# Patient Record
Sex: MALE | NOT HISPANIC OR LATINO | Employment: FULL TIME
[De-identification: names, ages, dates, MRNs, and addresses within clinical notes are randomized per-mention and may not be internally consistent; named-entity substitution may affect disease eponyms.]

---

## 2018-04-25 ENCOUNTER — RECORDS - HEALTHEAST (OUTPATIENT)
Dept: ADMINISTRATIVE | Facility: OTHER | Age: 53
End: 2018-04-25

## 2018-04-25 ENCOUNTER — OFFICE VISIT - HEALTHEAST (OUTPATIENT)
Dept: FAMILY MEDICINE | Facility: CLINIC | Age: 53
End: 2018-04-25

## 2018-04-25 DIAGNOSIS — Z00.00 HEALTH CARE MAINTENANCE: ICD-10-CM

## 2018-04-25 DIAGNOSIS — Z12.11 SPECIAL SCREENING FOR MALIGNANT NEOPLASMS, COLON: ICD-10-CM

## 2018-04-25 DIAGNOSIS — Z00.00 ROUTINE ADULT HEALTH MAINTENANCE: ICD-10-CM

## 2018-04-25 DIAGNOSIS — M15.9 GENERALIZED OA: ICD-10-CM

## 2018-04-25 ASSESSMENT — MIFFLIN-ST. JEOR: SCORE: 2531.57

## 2018-05-02 ENCOUNTER — AMBULATORY - HEALTHEAST (OUTPATIENT)
Dept: LAB | Facility: CLINIC | Age: 53
End: 2018-05-02

## 2018-05-02 DIAGNOSIS — Z00.00 HEALTH CARE MAINTENANCE: ICD-10-CM

## 2018-05-02 DIAGNOSIS — Z00.00 ROUTINE ADULT HEALTH MAINTENANCE: ICD-10-CM

## 2018-05-02 LAB
ALBUMIN SERPL-MCNC: 3.8 G/DL (ref 3.5–5)
ALP SERPL-CCNC: 56 U/L (ref 45–120)
ALT SERPL W P-5'-P-CCNC: 40 U/L (ref 0–45)
ANION GAP SERPL CALCULATED.3IONS-SCNC: 7 MMOL/L (ref 5–18)
AST SERPL W P-5'-P-CCNC: 23 U/L (ref 0–40)
BILIRUB SERPL-MCNC: 0.6 MG/DL (ref 0–1)
BUN SERPL-MCNC: 16 MG/DL (ref 8–22)
CALCIUM SERPL-MCNC: 9.2 MG/DL (ref 8.5–10.5)
CHLORIDE BLD-SCNC: 105 MMOL/L (ref 98–107)
CHOLEST SERPL-MCNC: 144 MG/DL
CO2 SERPL-SCNC: 27 MMOL/L (ref 22–31)
CREAT SERPL-MCNC: 0.9 MG/DL (ref 0.7–1.3)
ERYTHROCYTE [DISTWIDTH] IN BLOOD BY AUTOMATED COUNT: 13.6 % (ref 11–14.5)
FASTING STATUS PATIENT QL REPORTED: YES
GFR SERPL CREATININE-BSD FRML MDRD: >60 ML/MIN/1.73M2
GLUCOSE BLD-MCNC: 103 MG/DL (ref 70–125)
HCT VFR BLD AUTO: 41.8 % (ref 40–54)
HDLC SERPL-MCNC: 31 MG/DL
HGB BLD-MCNC: 14.2 G/DL (ref 14–18)
LDLC SERPL CALC-MCNC: 67 MG/DL
MCH RBC QN AUTO: 29.4 PG (ref 27–34)
MCHC RBC AUTO-ENTMCNC: 34 G/DL (ref 32–36)
MCV RBC AUTO: 87 FL (ref 80–100)
PLATELET # BLD AUTO: 176 THOU/UL (ref 140–440)
PMV BLD AUTO: 7.3 FL (ref 7–10)
POTASSIUM BLD-SCNC: 4.7 MMOL/L (ref 3.5–5)
PROT SERPL-MCNC: 6.8 G/DL (ref 6–8)
PSA SERPL-MCNC: 0.7 NG/ML (ref 0–3.5)
RBC # BLD AUTO: 4.83 MILL/UL (ref 4.4–6.2)
SODIUM SERPL-SCNC: 139 MMOL/L (ref 136–145)
TRIGL SERPL-MCNC: 232 MG/DL
WBC: 3.5 THOU/UL (ref 4–11)

## 2018-06-05 ENCOUNTER — RECORDS - HEALTHEAST (OUTPATIENT)
Dept: ADMINISTRATIVE | Facility: OTHER | Age: 53
End: 2018-06-05

## 2019-07-30 ENCOUNTER — RECORDS - HEALTHEAST (OUTPATIENT)
Dept: ADMINISTRATIVE | Facility: OTHER | Age: 54
End: 2019-07-30

## 2019-09-18 ENCOUNTER — OFFICE VISIT - HEALTHEAST (OUTPATIENT)
Dept: FAMILY MEDICINE | Facility: CLINIC | Age: 54
End: 2019-09-18

## 2019-09-18 DIAGNOSIS — J32.9 SINUSITIS, UNSPECIFIED CHRONICITY, UNSPECIFIED LOCATION: ICD-10-CM

## 2019-09-18 DIAGNOSIS — K21.9 GASTROESOPHAGEAL REFLUX DISEASE, ESOPHAGITIS PRESENCE NOT SPECIFIED: ICD-10-CM

## 2019-09-25 ENCOUNTER — OFFICE VISIT - HEALTHEAST (OUTPATIENT)
Dept: FAMILY MEDICINE | Facility: CLINIC | Age: 54
End: 2019-09-25

## 2019-09-25 DIAGNOSIS — J32.9 SINUSITIS, UNSPECIFIED CHRONICITY, UNSPECIFIED LOCATION: ICD-10-CM

## 2019-09-25 DIAGNOSIS — E66.813 CLASS 3 SEVERE OBESITY WITHOUT SERIOUS COMORBIDITY WITH BODY MASS INDEX (BMI) OF 40.0 TO 44.9 IN ADULT, UNSPECIFIED OBESITY TYPE (H): ICD-10-CM

## 2019-09-25 DIAGNOSIS — Z00.00 HEALTH CARE MAINTENANCE: ICD-10-CM

## 2019-09-25 DIAGNOSIS — K21.9 GASTROESOPHAGEAL REFLUX DISEASE, ESOPHAGITIS PRESENCE NOT SPECIFIED: ICD-10-CM

## 2019-09-25 DIAGNOSIS — E66.01 CLASS 3 SEVERE OBESITY WITHOUT SERIOUS COMORBIDITY WITH BODY MASS INDEX (BMI) OF 40.0 TO 44.9 IN ADULT, UNSPECIFIED OBESITY TYPE (H): ICD-10-CM

## 2019-09-25 ASSESSMENT — MIFFLIN-ST. JEOR: SCORE: 2455.93

## 2019-10-03 ENCOUNTER — COMMUNICATION - HEALTHEAST (OUTPATIENT)
Dept: FAMILY MEDICINE | Facility: CLINIC | Age: 54
End: 2019-10-03

## 2019-10-04 ENCOUNTER — AMBULATORY - HEALTHEAST (OUTPATIENT)
Dept: LAB | Facility: CLINIC | Age: 54
End: 2019-10-04

## 2019-10-04 DIAGNOSIS — Z00.00 HEALTH CARE MAINTENANCE: ICD-10-CM

## 2019-10-04 LAB
ALBUMIN SERPL-MCNC: 3.9 G/DL (ref 3.5–5)
ALP SERPL-CCNC: 52 U/L (ref 45–120)
ALT SERPL W P-5'-P-CCNC: 23 U/L (ref 0–45)
ANION GAP SERPL CALCULATED.3IONS-SCNC: 8 MMOL/L (ref 5–18)
AST SERPL W P-5'-P-CCNC: 16 U/L (ref 0–40)
BILIRUB SERPL-MCNC: 0.5 MG/DL (ref 0–1)
BUN SERPL-MCNC: 16 MG/DL (ref 8–22)
CALCIUM SERPL-MCNC: 9.4 MG/DL (ref 8.5–10.5)
CHLORIDE BLD-SCNC: 104 MMOL/L (ref 98–107)
CHOLEST SERPL-MCNC: 134 MG/DL
CO2 SERPL-SCNC: 28 MMOL/L (ref 22–31)
CREAT SERPL-MCNC: 0.93 MG/DL (ref 0.7–1.3)
ERYTHROCYTE [DISTWIDTH] IN BLOOD BY AUTOMATED COUNT: 12.4 % (ref 11–14.5)
FASTING STATUS PATIENT QL REPORTED: YES
GFR SERPL CREATININE-BSD FRML MDRD: >60 ML/MIN/1.73M2
GLUCOSE BLD-MCNC: 104 MG/DL (ref 70–125)
HCT VFR BLD AUTO: 41.3 % (ref 40–54)
HDLC SERPL-MCNC: 34 MG/DL
HGB BLD-MCNC: 13.6 G/DL (ref 14–18)
LDLC SERPL CALC-MCNC: 77 MG/DL
MCH RBC QN AUTO: 28.6 PG (ref 27–34)
MCHC RBC AUTO-ENTMCNC: 33 G/DL (ref 32–36)
MCV RBC AUTO: 87 FL (ref 80–100)
PLATELET # BLD AUTO: 193 THOU/UL (ref 140–440)
PMV BLD AUTO: 7.9 FL (ref 7–10)
POTASSIUM BLD-SCNC: 4.7 MMOL/L (ref 3.5–5)
PROT SERPL-MCNC: 6.3 G/DL (ref 6–8)
PSA SERPL-MCNC: 0.5 NG/ML (ref 0–3.5)
RBC # BLD AUTO: 4.75 MILL/UL (ref 4.4–6.2)
SODIUM SERPL-SCNC: 140 MMOL/L (ref 136–145)
TRIGL SERPL-MCNC: 114 MG/DL
WBC: 5.4 THOU/UL (ref 4–11)

## 2019-10-09 ENCOUNTER — RECORDS - HEALTHEAST (OUTPATIENT)
Dept: ADMINISTRATIVE | Facility: OTHER | Age: 54
End: 2019-10-09

## 2019-11-05 ENCOUNTER — RECORDS - HEALTHEAST (OUTPATIENT)
Dept: ADMINISTRATIVE | Facility: OTHER | Age: 54
End: 2019-11-05

## 2019-11-08 ENCOUNTER — COMMUNICATION - HEALTHEAST (OUTPATIENT)
Dept: FAMILY MEDICINE | Facility: CLINIC | Age: 54
End: 2019-11-08

## 2019-11-12 ENCOUNTER — AMBULATORY - HEALTHEAST (OUTPATIENT)
Dept: FAMILY MEDICINE | Facility: CLINIC | Age: 54
End: 2019-11-12

## 2019-11-12 DIAGNOSIS — K21.9 GASTROESOPHAGEAL REFLUX DISEASE, ESOPHAGITIS PRESENCE NOT SPECIFIED: ICD-10-CM

## 2019-12-20 ENCOUNTER — OFFICE VISIT - HEALTHEAST (OUTPATIENT)
Dept: FAMILY MEDICINE | Facility: CLINIC | Age: 54
End: 2019-12-20

## 2019-12-20 DIAGNOSIS — J06.9 UPPER RESPIRATORY TRACT INFECTION, UNSPECIFIED TYPE: ICD-10-CM

## 2019-12-20 DIAGNOSIS — K21.9 GASTROESOPHAGEAL REFLUX DISEASE, ESOPHAGITIS PRESENCE NOT SPECIFIED: ICD-10-CM

## 2020-02-17 ENCOUNTER — COMMUNICATION - HEALTHEAST (OUTPATIENT)
Dept: FAMILY MEDICINE | Facility: CLINIC | Age: 55
End: 2020-02-17

## 2020-02-17 DIAGNOSIS — K21.9 GASTROESOPHAGEAL REFLUX DISEASE, ESOPHAGITIS PRESENCE NOT SPECIFIED: ICD-10-CM

## 2020-03-17 ENCOUNTER — COMMUNICATION - HEALTHEAST (OUTPATIENT)
Dept: FAMILY MEDICINE | Facility: CLINIC | Age: 55
End: 2020-03-17

## 2020-03-17 DIAGNOSIS — K21.9 GASTROESOPHAGEAL REFLUX DISEASE, ESOPHAGITIS PRESENCE NOT SPECIFIED: ICD-10-CM

## 2020-09-14 ENCOUNTER — COMMUNICATION - HEALTHEAST (OUTPATIENT)
Dept: SCHEDULING | Facility: CLINIC | Age: 55
End: 2020-09-14

## 2020-11-25 ENCOUNTER — OFFICE VISIT - HEALTHEAST (OUTPATIENT)
Dept: FAMILY MEDICINE | Facility: CLINIC | Age: 55
End: 2020-11-25

## 2020-11-25 DIAGNOSIS — Z86.19 HISTORY OF POSITIVE HEPATITIS C: ICD-10-CM

## 2020-11-25 DIAGNOSIS — E66.01 MORBID OBESITY (H): ICD-10-CM

## 2020-11-25 DIAGNOSIS — Z01.818 PREOP GENERAL PHYSICAL EXAM: ICD-10-CM

## 2020-11-25 DIAGNOSIS — K21.9 GASTROESOPHAGEAL REFLUX DISEASE: ICD-10-CM

## 2020-11-25 DIAGNOSIS — R06.83 SNORING: ICD-10-CM

## 2020-11-25 LAB
ANION GAP SERPL CALCULATED.3IONS-SCNC: 12 MMOL/L (ref 5–18)
BUN SERPL-MCNC: 14 MG/DL (ref 8–22)
CALCIUM SERPL-MCNC: 9.1 MG/DL (ref 8.5–10.5)
CHLORIDE BLD-SCNC: 107 MMOL/L (ref 98–107)
CO2 SERPL-SCNC: 23 MMOL/L (ref 22–31)
CREAT SERPL-MCNC: 0.82 MG/DL (ref 0.7–1.3)
ERYTHROCYTE [DISTWIDTH] IN BLOOD BY AUTOMATED COUNT: 12.9 % (ref 11–14.5)
GFR SERPL CREATININE-BSD FRML MDRD: >60 ML/MIN/1.73M2
GLUCOSE BLD-MCNC: 101 MG/DL (ref 70–125)
HCT VFR BLD AUTO: 42.8 % (ref 40–54)
HCV AB SERPL QL IA: POSITIVE
HGB BLD-MCNC: 14.3 G/DL (ref 14–18)
MCH RBC QN AUTO: 29.4 PG (ref 27–34)
MCHC RBC AUTO-ENTMCNC: 33.4 G/DL (ref 32–36)
MCV RBC AUTO: 88 FL (ref 80–100)
PLATELET # BLD AUTO: 177 THOU/UL (ref 140–440)
PMV BLD AUTO: 7.7 FL (ref 7–10)
POTASSIUM BLD-SCNC: 4.8 MMOL/L (ref 3.5–5)
RBC # BLD AUTO: 4.85 MILL/UL (ref 4.4–6.2)
SODIUM SERPL-SCNC: 142 MMOL/L (ref 136–145)
WBC: 3.6 THOU/UL (ref 4–11)

## 2020-11-25 RX ORDER — FAMOTIDINE 20 MG/1
20 TABLET, FILM COATED ORAL 2 TIMES DAILY PRN
Qty: 60 TABLET | Refills: 2 | Status: SHIPPED | OUTPATIENT
Start: 2020-11-25 | End: 2022-02-23

## 2020-11-25 ASSESSMENT — MIFFLIN-ST. JEOR: SCORE: 2520.22

## 2020-11-27 ENCOUNTER — AMBULATORY - HEALTHEAST (OUTPATIENT)
Dept: FAMILY MEDICINE | Facility: CLINIC | Age: 55
End: 2020-11-27

## 2020-11-27 DIAGNOSIS — Z86.19 HISTORY OF POSITIVE HEPATITIS C: ICD-10-CM

## 2020-12-01 ENCOUNTER — AMBULATORY - HEALTHEAST (OUTPATIENT)
Dept: LAB | Facility: CLINIC | Age: 55
End: 2020-12-01

## 2020-12-01 DIAGNOSIS — Z86.19 HISTORY OF POSITIVE HEPATITIS C: ICD-10-CM

## 2020-12-04 ENCOUNTER — COMMUNICATION - HEALTHEAST (OUTPATIENT)
Dept: FAMILY MEDICINE | Facility: CLINIC | Age: 55
End: 2020-12-04

## 2020-12-04 LAB
HCV RNA SERPL NAA+PROBE-ACNC: NORMAL [IU]/ML
HCV RNA SERPL NAA+PROBE-LOG IU: NORMAL LOG IU/ML

## 2020-12-15 ENCOUNTER — COMMUNICATION - HEALTHEAST (OUTPATIENT)
Dept: SLEEP MEDICINE | Facility: CLINIC | Age: 55
End: 2020-12-15

## 2020-12-18 ENCOUNTER — RECORDS - HEALTHEAST (OUTPATIENT)
Dept: ADMINISTRATIVE | Facility: OTHER | Age: 55
End: 2020-12-18

## 2021-01-06 ENCOUNTER — RECORDS - HEALTHEAST (OUTPATIENT)
Dept: ADMINISTRATIVE | Facility: OTHER | Age: 56
End: 2021-01-06

## 2021-02-03 ENCOUNTER — RECORDS - HEALTHEAST (OUTPATIENT)
Dept: ADMINISTRATIVE | Facility: OTHER | Age: 56
End: 2021-02-03

## 2021-05-13 ENCOUNTER — RECORDS - HEALTHEAST (OUTPATIENT)
Dept: ADMINISTRATIVE | Facility: OTHER | Age: 56
End: 2021-05-13

## 2021-05-24 ENCOUNTER — OFFICE VISIT - HEALTHEAST (OUTPATIENT)
Dept: FAMILY MEDICINE | Facility: CLINIC | Age: 56
End: 2021-05-24

## 2021-05-24 DIAGNOSIS — R05.9 COUGH: ICD-10-CM

## 2021-05-24 DIAGNOSIS — J06.9 UPPER RESPIRATORY TRACT INFECTION, UNSPECIFIED TYPE: ICD-10-CM

## 2021-06-01 VITALS — WEIGHT: 315 LBS | HEIGHT: 77 IN | BODY MASS INDEX: 37.19 KG/M2

## 2021-06-01 NOTE — PROGRESS NOTES
Assessment:      Healthy male exam.    [facility]  GERD  Sinusitis  Obesity  Plan:       All questions answered.    HCM-patient is up-to-date on immunizations, will check with insurance on coverage for the shingles vaccine, will return at a future date for fasting blood work, up-to-date on colonoscopy  GERD-patient takes omeprazole daily but continues to have some symptoms after eating-recently a little worse with a sinus infection-discussed with him after completion of antibiotics if symptoms continue would recommend EGD for further evaluation  Sinusitis-patient symptoms have been improving since last week since starting amoxicillin he has a few days left on the antibiotics-with lingering symptoms still present although improving I am going to extend the course of treatment from 10 days out of 14  Obesity-patient is aware of his weight and is actively working on lifestyle changes and eating habits has lost about 20 pounds since last year-encouraged him to continue with improvement and hopefully we see some benefit when looking at cholesterol levels as well  Subjective:      Miky Mccann is a 54 y.o. male who presents for an annual exam. The patient reports that there is not domestic violence in his life.  Patient is here for annual exam.  He will need to return for fasting blood work in the future when fasting.  He is up-to-date on colonoscopy screening.  Discussed the new shingles vaccine  Patient is aware of his weight and the need for weight loss-he is lost about 20 pounds since last year and I encouraged him to continue work on lifestyle changes  He was seen last week for increasing congestion and sinus trouble and started on antibody 6-a 10-day course was given and his symptoms have improved greatly but have not resolved discussed with him I think he is in need 14 days of treatment and additional antibiotics will be sent to the pharmacy.  During this time his acid reflux seems to have given him more problems as  well he has been taking 40 mg daily of omeprazole and we discussed if symptoms are not resolved he still continues to have acid reflux symptoms after completion of antibiotics for the sinus infection that we should consider doing an EGD-he expresses understanding with this plan.    Healthy Habits:   Regular Exercise: Yes  Sunscreen Use: Yes  Healthy Diet: Yes  Dental Visits Regularly: Yes  Seat Belt: Yes  Sexually active: Yes  Colonoscopy: Yes  Lipid Profile: Yes  Glucose Screen: Yes      Immunization History   Administered Date(s) Administered     Influenza, seasonal,quad inj 6-35 mos 10/01/2010, 09/17/2013     Td,adult,historic,unspecified 02/25/2009     Tdap 02/25/2009     Immunization status: up to date and documented.    No exam data present    Current Outpatient Medications   Medication Sig Dispense Refill     amoxicillin (AMOXIL) 500 MG tablet Take 1 tablet (500 mg total) by mouth 3 (three) times a day for 4 days. 12 tablet 0     fluticasone propionate (FLONASE) 50 mcg/actuation nasal spray SPRAY 2 SPRAYS INTO EACH NOSTRIL EVERY DAY AS DIRECTED  1     ibuprofen (ADVIL,MOTRIN) 200 MG tablet Take 800 mg by mouth daily as needed for pain.       omeprazole (PRILOSEC) 20 MG capsule TAKE 2 CAPSULES BY MOUTH EVERY DAY AS DIRECTED. MAX 30 DAY SUPPLY  0     No current facility-administered medications for this visit.      No past medical history on file.  No past surgical history on file.  Patient has no known allergies.  No family history on file.  Social History     Socioeconomic History     Marital status:      Spouse name: Not on file     Number of children: Not on file     Years of education: Not on file     Highest education level: Not on file   Occupational History     Not on file   Social Needs     Financial resource strain: Not on file     Food insecurity:     Worry: Not on file     Inability: Not on file     Transportation needs:     Medical: Not on file     Non-medical: Not on file   Tobacco Use      "Smoking status: Former Smoker     Smokeless tobacco: Former User     Types: Chew     Quit date: 7/18/2019   Substance and Sexual Activity     Alcohol use: Yes     Alcohol/week: 8.3 - 10.0 standard drinks     Types: 10 - 12 Standard drinks or equivalent per week     Drug use: No     Sexual activity: Not on file   Lifestyle     Physical activity:     Days per week: Not on file     Minutes per session: Not on file     Stress: Not on file   Relationships     Social connections:     Talks on phone: Not on file     Gets together: Not on file     Attends Hindu service: Not on file     Active member of club or organization: Not on file     Attends meetings of clubs or organizations: Not on file     Relationship status: Not on file     Intimate partner violence:     Fear of current or ex partner: Not on file     Emotionally abused: Not on file     Physically abused: Not on file     Forced sexual activity: Not on file   Other Topics Concern     Not on file   Social History Narrative     Not on file       Review of Systems  General:  Denies problem  Eyes: Denies problem  Ears/Nose/Throat: Denies problem  Cardiovascular: Denies problem  Respiratory:  Denies problem  Gastrointestinal:  Denies problem  Genitourinary: Denies problem  Musculoskeletal:  Denies problem  Skin: Denies problem  Neurologic: Denies problem  Psychiatric: Denies problem  Endocrine: Denies problem  Heme/Lymphatic: Denies problem   Allergic/Immunologic: Denies problem        Objective:     Vitals:    09/25/19 1548   BP: 110/73   Pulse: 63   Resp: 20   Temp: (!) 96.5  F (35.8  C)   TempSrc: Oral   SpO2: 97%   Weight: (!) 335 lb 3.2 oz (152 kg)   Height: 6' 4.25\" (1.937 m)     Body mass index is 40.53 kg/m .    Physical  General Appearance: Alert, cooperative, no distress, appears stated age  Head: Normocephalic, without obvious abnormality, atraumatic  Eyes: PERRL, conjunctiva/corneas clear, EOM's intact  Ears: fluid behind TM's BL, no erythema  Nose: " Nares normal, septum midline,mucosa normal, no drainage  Throat: Lips, mucosa, and tongue normal; teeth and gums normal  Neck: Supple, symmetrical, trachea midline, no adenopathy;  thyroid: not enlarged, symmetric, no tenderness/mass/nodules; no carotid bruit or JVD  Back: Symmetric, no curvature, ROM normal, no CVA tenderness  Lungs: Clear to auscultation bilaterally, respirations unlabored  Heart: Regular rate and rhythm, S1 and S2 normal, no murmur, rub, or gallop,  Abdomen: Soft, non-tender, obese BMI 40  Musculoskeletal: Normal range of motion. No joint swelling or deformity.   Extremities: Extremities normal, atraumatic, no cyanosis or edema  Skin: Skin color, texture, turgor normal, no rashes or lesions  Lymph nodes: Cervical, supraclavicular, and axillary nodes normal  Neurologic: He is alert. He has normal reflexes.   Psychiatric: He has a normal mood and affect.

## 2021-06-01 NOTE — PROGRESS NOTES
ASSESSMENT/PLAN  1. Sinusitis, unspecified chronicity, unspecified location  Patient continues to have upper respiratory symptoms and congestion with drainage  Symptoms been present for weeks  Discussed on the course of amoxicillin 3 times daily for 10 days  Patient has a scheduled follow-up physical with me next week and we can check back in with improvement of symptoms  - amoxicillin (AMOXIL) 500 MG tablet; Take 1 tablet (500 mg total) by mouth 3 (three) times a day for 10 days.  Dispense: 30 tablet; Refill: 0    2. Gastroesophageal reflux disease, esophagitis presence not specified  Patient was seen at the ER for throat discomfort and some chest abnormality was followed up with ENT and diagnosed with acid reflux started on omeprazole  Patient has noted some improvement in his made some dietary lifestyle changes  Continues to have some congestion and not feeling back to baseline with upper respiratory symptoms  Discussed with him starting antibiotics for sinusitis  Continue with omeprazole  Continue with lifestyle changes with dietary changes and continued weight loss        SUBJECTIVE:   Chief Complaint   Patient presents with     Sore Throat     Pain in the throat x 2 months,  Sensitive when swallowing,      Miky Mccann 54 y.o. male    Current Outpatient Medications   Medication Sig Dispense Refill     fluticasone propionate (FLONASE) 50 mcg/actuation nasal spray SPRAY 2 SPRAYS INTO EACH NOSTRIL EVERY DAY AS DIRECTED  1     ibuprofen (ADVIL,MOTRIN) 200 MG tablet Take 800 mg by mouth daily as needed for pain.       omeprazole (PRILOSEC) 20 MG capsule TAKE 2 CAPSULES BY MOUTH EVERY DAY AS DIRECTED. MAX 30 DAY SUPPLY  0     amoxicillin (AMOXIL) 500 MG tablet Take 1 tablet (500 mg total) by mouth 3 (three) times a day for 10 days. 30 tablet 0     No current facility-administered medications for this visit.      Allergies: Patient has no known allergies.   No LMP for male patient.    HPI:   Patient is here for  ER follow-up and ENT follow-up.  Patient was seen over a month ago at the ER and imaging and lab work reviewed with the patient today.  He followed up with a ENT and after visualization of vocal cords and discussion he was started on omeprazole 20 mg twice daily.  His symptoms of acid reflux have improved however continues to have her upper airway congestion and drainage-discussed with him the diagnosis of sinusitis in addition to suspected acid reflux and possible gastritis.  I recommend starting amoxicillin for 10 days.  He has a follow-up physical with me next week and we can reassess symptoms after being on antibiotics.  He has made some lifestyle modifications which is helped with weight loss and decrease in acid reflux symptoms.    He is meeting with orthopedics and contemplating knee replacement in the near future.    ROS: negative except as per HPI    OBJECTIVE:   The patient appears well, alert, oriented x 3, in no distress.  Hoarse voice  /78 (Patient Site: Left Arm, Patient Position: Sitting, Cuff Size: Adult Large)   Pulse 71   Resp 18   Wt (!) 337 lb 6.4 oz (153 kg)   BMI 38.99 kg/m      HEENT: Patient has some posterior oropharynx drainage, positive lymphadenopathy anterior cervical chains bilaterally, fluid noted behind the TMs bilaterally right greater than left no erythema  Lungs: clear, good air entry, no wheezes, rhonchi or rales.   Cardiac: S1 and S2 normal, no murmurs, regular rate and rhythm.   Abdomen: normal bowel sounds, soft   Extremities: show no edema, normal peripheral pulses.   Neurological: normal, no focal findings.  Skin: clear, dry, no rashes/lesions  Psych- normal mood and affect      Pt states an understanding and agrees to the above plan.

## 2021-06-03 VITALS
BODY MASS INDEX: 38.36 KG/M2 | HEART RATE: 63 BPM | DIASTOLIC BLOOD PRESSURE: 73 MMHG | OXYGEN SATURATION: 97 % | SYSTOLIC BLOOD PRESSURE: 110 MMHG | WEIGHT: 315 LBS | HEIGHT: 76 IN | TEMPERATURE: 96.5 F | RESPIRATION RATE: 20 BRPM

## 2021-06-03 VITALS
BODY MASS INDEX: 38.99 KG/M2 | DIASTOLIC BLOOD PRESSURE: 78 MMHG | RESPIRATION RATE: 18 BRPM | SYSTOLIC BLOOD PRESSURE: 121 MMHG | HEART RATE: 71 BPM | WEIGHT: 315 LBS

## 2021-06-03 NOTE — TELEPHONE ENCOUNTER
----- Message from Alexandro Tan MD sent at 11/8/2019  1:26 PM CST -----  EGD results show acid reflux but no other concerns- their recommendation was to add pepcid to the current medication regimen.  This is an over the counter medication- if you would like me to send this to the pharmacy as a script please let me know.  Dr. Tan

## 2021-06-03 NOTE — TELEPHONE ENCOUNTER
Answering the question from the patient- is this life long- we should add it for a few months- and then go without it to see if it is needed.  Dr. Tan

## 2021-06-03 NOTE — TELEPHONE ENCOUNTER
Patient Returning Call  Reason for call:  Message from clinic  Information relayed to patient:  Message from Alexandro Tan MD sent at 11/8/2019  1:26 PM CST -----  EGD results show acid reflux but no other concerns- their recommendation was to add pepcid to the current medication regimen.  This is an over the counter medication- if you would like me to send this to the pharmacy as a script please let me know.  Dr. Tan  Patient has additional questions:  Yes  If YES, what are your questions/concerns:  1)  Please send a prescription to [facility][id number].  2) Will this medication to life long?    Okay to leave a detailed message?: Yes

## 2021-06-03 NOTE — TELEPHONE ENCOUNTER
Left message to call back for: results  Information to relay to patient:  LMTCB, Please relay message below from .

## 2021-06-04 VITALS
HEART RATE: 77 BPM | WEIGHT: 315 LBS | BODY MASS INDEX: 40.87 KG/M2 | DIASTOLIC BLOOD PRESSURE: 76 MMHG | RESPIRATION RATE: 20 BRPM | TEMPERATURE: 97.1 F | SYSTOLIC BLOOD PRESSURE: 108 MMHG

## 2021-06-04 NOTE — PROGRESS NOTES
ASSESSMENT/PLAN  1. Gastroesophageal reflux disease, esophagitis presence not specified  Patient continues to have some symptoms of acid reflux  We will change omeprazole to Protonix and continue with Pepcid  Patient will contact me if symptoms are not further improving  - pantoprazole (PROTONIX) 20 MG tablet; Take 1 tablet (20 mg total) by mouth 2 (two) times a day.  Dispense: 60 tablet; Refill: 1    2. Upper respiratory tract infection, unspecified type  Patient had increasing sinus symptoms last week but these have been slowly improving  Continue to monitor  Discussed hydration and using over-the-counter medications to improve symptoms        SUBJECTIVE:   Chief Complaint   Patient presents with     URI     Sinus pressure- worse on the left side      Follow Up     Discuss endoscopy results, heartburn      Miky Mccann 54 y.o. male    Current Outpatient Medications   Medication Sig Dispense Refill     famotidine (PEPCID) 20 MG tablet Take 1 tablet (20 mg total) by mouth 2 (two) times a day. 60 tablet 3     ibuprofen (ADVIL,MOTRIN) 200 MG tablet Take 800 mg by mouth daily as needed for pain.       pantoprazole (PROTONIX) 20 MG tablet Take 1 tablet (20 mg total) by mouth 2 (two) times a day. 60 tablet 1     No current facility-administered medications for this visit.      Allergies: Patient has no known allergies.   No LMP for male patient.    HPI:   Patient presented for follow-up after EGD  Patient had an EGD to further evaluate ongoing acid reflux symptoms  Is currently taking omeprazole and Pepcid twice daily  With ongoing symptoms on a regular basis discussed with him transitioning from omeprazole to Protonix  Patient is in agreement will contact me if symptoms are not improving    Reviewed EGD results with him today    Last week patient had increasing sinus pressure and congestion  Symptoms have slowly been improving over the last few days and we discussed continued monitoring    Update with a flu  vaccine today    ROS: negative except as per HPI    OBJECTIVE:   The patient appears well, alert, oriented x 3, in no distress.  /76 (Patient Site: Left Arm, Patient Position: Sitting, Cuff Size: Adult Large)   Pulse 77   Temp 97.1  F (36.2  C) (Oral)   Resp 20   Wt (!) 338 lb (153.3 kg)   BMI 40.87 kg/m      Lungs: clear, good air entry, no wheezes, rhonchi or rales.   Cardiac: S1 and S2 normal, no murmurs, regular rate and rhythm.   Abdomen: normal bowel sounds, soft   Extremities: show no edema, normal peripheral pulses.   Neurological: normal, no focal findings.  Skin: clear, dry, no rashes/lesions  Psych- normal mood and affect      Pt states an understanding and agrees to the above plan.

## 2021-06-05 VITALS
RESPIRATION RATE: 16 BRPM | SYSTOLIC BLOOD PRESSURE: 120 MMHG | HEIGHT: 76 IN | DIASTOLIC BLOOD PRESSURE: 81 MMHG | TEMPERATURE: 97.3 F | WEIGHT: 315 LBS | HEART RATE: 86 BPM | BODY MASS INDEX: 38.36 KG/M2

## 2021-06-06 NOTE — TELEPHONE ENCOUNTER
Received fax from pharmacy requesting refill on famotidine  Last OV: 12/20/19  Last refill: 11/12/19

## 2021-06-06 NOTE — TELEPHONE ENCOUNTER
Refill Approved    Rx renewed per Medication Renewal Policy. Medication was last renewed on 12/20/19.    Michelle Dwyer, [facility] Connection Triage/Med Refill 2/20/2020     Requested Prescriptions   Pending Prescriptions Disp Refills     pantoprazole (PROTONIX) 20 MG tablet 60 tablet 1     Sig: Take 1 tablet (20 mg total) by mouth 2 (two) times a day.       GI Medications Refill Protocol Passed - 2/17/2020  1:36 PM        Passed - PCP or prescribing provider visit in last 12 or next 3 months.     Last office visit with prescriber/PCP: 12/20/2019 Alexandro Tan MD OR same dept: 12/20/2019 Alexandro Tan MD OR same specialty: 12/20/2019 Alexandro Tan MD  Last physical: 9/25/2019 Last MTM visit: Visit date not found   Next visit within 3 mo: Visit date not found  Next physical within 3 mo: Visit date not found  Prescriber OR PCP: Alexandro Tan MD  Last diagnosis associated with med order: 1. Gastroesophageal reflux disease, esophagitis presence not specified  - pantoprazole (PROTONIX) 20 MG tablet; Take 1 tablet (20 mg total) by mouth 2 (two) times a day.  Dispense: 60 tablet; Refill: 1    If protocol passes may refill for 12 months if within 3 months of last provider visit (or a total of 15 months).

## 2021-06-13 NOTE — PROGRESS NOTES
I called the patient but no answer. Left message explaining recent clinic results and next steps. Advised to call clinic or send [employer] message with questions. Recommended he call and make a lab visit to have Hep C RNA quant drawn to determine if active infection or if cleared.     Dr. Ernst Wayne

## 2021-06-13 NOTE — PATIENT INSTRUCTIONS - HE

## 2021-06-13 NOTE — PROGRESS NOTES
"[facility]  [address] [facility] [address]  Dept: [phone]  Dept Fax: [phone]  Primary Provider: Alexandro Tan MD  Pre-op Performing Provider: CHANO VITALE    PREOPERATIVE EVALUATION:  Today's date: 11/25/2020    Miky Mccann is a 55 y.o. male who presents for a preoperative evaluation.    Surgical Information:  Surgery/Procedure: Right total knee replacement   Surgery Location: [facility]   Surgeon: Dr. Orr   Surgery Date: 12/9/2020  Time of Surgery: 8am  Where patient plans to recover: At home with family  Fax number for surgical facility: [phone]    Type of Anesthesia Anticipated: General    Subjective     HPI related to upcoming procedure: Over the last 5-6 years been having pain in the right knee.  Has been receiving regular cortisone shots through [place] for this for many years. Has been following with Dr. Orr for this. Reviewed MRI from 5 years ago and copied below of right knee. Recent X-ray of right knee showed \"bone on Bone\" per patient.  Cannot see this x-ray    MRI LEFT KNEE  6/30/2014 6:33 PM     INDICATION: Pain  TECHNIQUE: Routine.  COMPARISON: None Available     FINDINGS:  MEDIAL COMPARTMENT: Large radial tear of the posterior horn of the medial meniscus extends nearly the entire distance through the meniscus and is likely unstable. There is also a horizontal cleavage tear which extends into the medial meniscal body.   Thinning of the articular cartilage on both sides of the compartment, more marked along the medial femoral condyle. Reactive edema within the bones on both sides of the medial compartment.     LATERAL COMPARTMENT: The lateral meniscus is intact. Smooth articular cartilage.     PATELLOFEMORAL COMPARTMENT: The retinaculum is intact. There is grade 3 chondromalacia along the lateral retropatellar facet with reactive subchondral edema and cystic change.     LIGAMENTS " AND TENDONS: The cruciate ligaments are intact. Grade 1 to grade 2 sprain of the MCL without evidence of tearing. Low-grade sprain of the fibular collateral joint. Mild popliteus tendinopathy. The posterolateral corner structures are intact.   The quadriceps and patella tendons are negative.     BONES AND SOFT TISSUES: No evidence for fracture. Small knee joint effusion. Soft tissues of the popliteal fossa are negative.     IMPRESSION:   CONCLUSION:  1.  Large radial tear of the posterior horn of the medial meniscus extends nearly the entire distance through the meniscus and is likely unstable.  2.  There is a horizontal cleavage tear extending from this radial tear into the medial meniscal body.  3.  Chondromalacia medial joint compartment, more marked on the femoral side. Extensive reactive subchondral edema on both sides of the compartment.  4.  Grade 1 to grade 2 sprain of the MCL without evidence of tearing.  5.  Low-grade sprain of the fibular collateral ligament without tearing.  6.  Popliteus tendinopathy.  7.  Grade 2 to grade 3 chondromalacia patella lateral retropatellar facet.  8.  Knee joint effusion.  9.  No evidence for fracture.    Preop Questions 11/22/2020   Have you ever had a heart attack or stroke? No   Have you ever had surgery on your heart or blood vessels, such as a stent placement, a coronary artery bypass, or surgery on an artery in your head, neck, heart, or legs? No   Do you have chest pain with activity? No   Do you have a history of  heart failure? No   Do you currently have a cold, bronchitis or symptoms of other infection? No   Do you have a cough, shortness of breath, or wheezing? UNKNOWN - Since he was diagnoed with acid reflux last summer he wheezes some.     Do you or anyone in your family have previous history of blood clots? No   Do you or does anyone in your family have a serious bleeding problem such as prolonged bleeding following surgeries or cuts? No   Have you ever had  problems with anemia or been told to take iron pills? No   Have you had any abnormal blood loss such as black, tarry or bloody stools? No   Have you ever had a blood transfusion? No   Are you willing to have a blood transfusion if it is medically needed before, during, or after your surgery? Yes   Have you or any of your relatives ever had problems with anesthesia? No   Do you have sleep apnea, excessive snoring or daytime drowsiness? UNKNOWN - He snores a lot.    Do you have any artifical heart valves or other implanted medical devices like a pacemaker, defibrillator, or continuous glucose monitor? No   Do you have artificial joints? No   Are you allergic to latex? No     Possible Sleep Apnea:    STOP-Bang Total Score 11/25/2020   Total Score 6   Risk Stratification 5 - 8: High Risk for ANNE-MARIE       Health Care Directive:  Patient does not have a Health Care Directive or Living Will: Discussed advance care planning with patient; however, patient declined at this time.    Preoperative Review of :    reviewed - no record of controlled substances prescribed.    SLEEP PROBLEM - Patient has a longstanding history of snoring, excessive daytime somnolence and fatigue.. Patient has tried OTC medications with limited success.       Review of Systems  ROS is negative except as noted in the HPI    Patient Active Problem List    Diagnosis Date Noted     Morbid obesity (H) 11/25/2020     Hepatitis C 11/25/2020     Gastroesophageal reflux disease 11/25/2020     Acute Meniscal Tear      No past medical history on file.  No past surgical history on file.  Current Outpatient Medications   Medication Sig Dispense Refill     famotidine (PEPCID) 20 MG tablet Take 1 tablet (20 mg total) by mouth 2 (two) times a day as needed for heartburn. 60 tablet 2     No current facility-administered medications for this visit.        No Known Allergies    Social History     Tobacco Use     Smoking status: Former Smoker     Packs/day: 1.00      "Years: 10.00     Pack years: 10.00     Smokeless tobacco: Former User     Types: Chew     Quit date: 7/18/2019   Substance Use Topics     Alcohol use: Yes     Alcohol/week: 8.3 - 10.0 standard drinks     Types: 10 - 12 Standard drinks or equivalent per week      No family history on file.  Social History     Substance and Sexual Activity   Drug Use No        Objective     Physical Exam  /81 (Patient Site: Left Arm, Patient Position: Sitting, Cuff Size: Adult Large)   Pulse 86   Temp 97.3  F (36.3  C) (Oral)   Resp 16   Ht 6' 4.25\" (1.937 m)   Wt (!) 349 lb 6 oz (158.5 kg)   BMI 42.25 kg/m      General appearance: Alert, cooperative, no distress, appears stated age, obese  Head: Normocephalic, atraumatic, without obvious abnormality  Eyes: Pupils equal round, reactive.  Conjunctiva clear.  Ears: TM's grey dull with structures seen bilaterally  Nose: Nares normal, no drainage.  Throat: Lips, mucosa, tongue normal mucosa pink and moist  Neck: Supple, symmetric, trachea midline, no adenopathy.   Lungs: Clear to auscultation bilaterally, no wheezing or crackles present.  Respirations unlabored  Heart: Regular rate and rhythm, normal S1 and S2, no murmur, rub or gallop.  Abdomen: Soft, nontender, nondistended.  Bowel sounds active in all 4 quadrants.  No masses or organomegaly.  Extremities: Extremities normal, atraumatic.  No cyanosis or edema.  Skin: Skin color, texture, turgor normal no rashes or lesions on limited skin exam  Neurologic: CN II through XII intact, normal strength.    Recent Labs   Lab Test 10/04/19  0736 07/28/19  2215   HGB 13.6* 13.1*    182    140   K 4.7 4.0   CREATININE 0.93 0.98        PRE-OP Diagnostics:   Labs pending at this time. Results will be reviewed when available.  No EKG required, no history of coronary heart disease, significant arrhythmia, peripheral arterial disease or other structural heart disease.    REVISED CARDIAC RISK INDEX (RCRI)   The patient has the " following serious cardiovascular risks for perioperative complications:   - No serious cardiac risks = 0 points    RCRI INTERPRETATION: 0 points: Class I (very low risk - 0.4% complication rate)       Assessment & Plan     4. Preop general physical exam  Patient needs following labs per surgeon request.  Patient has appointment with surgeon's office on 2 December and asked him to discuss Covid testing at that time.  - HM2(CBC w/o Differential)  - Basic Metabolic Panel    1. Gastroesophageal reflux disease  Refilled  - famotidine (PEPCID) 20 MG tablet; Take 1 tablet (20 mg total) by mouth 2 (two) times a day as needed for heartburn.  Dispense: 60 tablet; Refill: 2    2. Morbid obesity (H)  3. Snoring  Patient with a positive review of systems with the nurse for snoring.  Has BMI of 42 and morbid obesity.  Stop bang questionnaire done with patient and score of 6 which is high risk.  We will send patient for sleep study.  He does not need to have this before surgery necessarily but note that he may have hypoxia during the night  at the hospital floor may have more difficulty being extubated.  - Ambulatory referral to Sleep Medicine    5. History of positive hepatitis C  Patient with history that he has had a positive hepatitis C in the past.  [facility]  He states they told him later that he had cleared it.  He is very confused though  By this.  We will retest today as if he is positive will need further work-up and likely referral to GI for continued management.  - Hepatitis C Antibody (Anti-HCV)    The proposed surgical procedure is considered INTERMEDIATE risk.    Risks and Recommendations:  The patient has the following additional risks and recommendations for perioperative complications:   - Consult Hospitalist / IM to assist with post-op medical management   - Morbid obesity (BMI >40)    Medication Instructions:  Patient is on no chronic medications    RECOMMENDATION:  APPROVAL GIVEN to proceed with  proposed procedure pending review of diagnostic evaluation.    Signed Electronically by: Ernst Phoenix MD    Copy of this evaluation report is provided to requesting physician.

## 2021-06-16 PROBLEM — B19.20 HEPATITIS C: Status: ACTIVE | Noted: 2020-11-25

## 2021-06-16 PROBLEM — K21.9 GASTROESOPHAGEAL REFLUX DISEASE: Status: ACTIVE | Noted: 2020-11-25

## 2021-06-16 PROBLEM — E66.01 MORBID OBESITY (H): Status: ACTIVE | Noted: 2020-11-25

## 2021-06-17 NOTE — PROGRESS NOTES
Assessment:      Healthy male exam.    [facility]  Obesity  OA  Plan:       All questions answered.    [facility]-discussed colon cancer screening, will be obtaining fasting labs when he can return fasting and follow based on results  Obesity-discussed diet and exercise need for weight loss  OA-pt has been undergoing injections in knees- less and less effective- has been having some wrist problems as well, following with ortho  Subjective:      Miky Mccann is a 52 y.o. male who presents for an annual exam. The patient reports that there is not domestic violence in his life.  Patient is here for yearly exam.  He has been experiencing bilateral knee pain which is been following with orthopedics for nursing injections unfortunately duration of improvement has been lessening over the last couple years.  Now he is barely getting even a month of relief after injections.  We discussed that in the future he will likely be needing knee replacements which he is understanding of.  He has been having some stiffness and aches coming from his wrist with certain rotational movements some slight swelling is noted we discussed likely osteoarthritic changes in the wrist as well.  He knows he has been gaining weight he needs work on weight loss we discussed diet and exercise.  Is up-to-date on immunizations will check with his insurance about coverage for the new shingles vaccine.  He is going to look into [facility].    Healthy Habits:   Regular Exercise: Yes  Sunscreen Use: Yes  Healthy Diet: Yes  Dental Visits Regularly: Yes  Seat Belt: Yes  Sexually active: Yes  Lipid Profile: Yes  Glucose Screen: Yes        Immunization History   Administered Date(s) Administered     Influenza, seasonal,quad inj 6-35 mos 10/01/2010, 09/17/2013     Td,adult,historic,unspecified 02/25/2009     Tdap 02/25/2009     Immunization status: up to date and documented.    No exam data present    Current Outpatient Prescriptions   Medication Sig Dispense Refill      "ibuprofen (ADVIL,MOTRIN) 200 MG tablet Take 800 mg by mouth daily as needed for pain.       No current facility-administered medications for this visit.      No past medical history on file.  No past surgical history on file.  Review of patient's allergies indicates no known allergies.  No family history on file.  Social History     Social History     Marital status:      Spouse name: N/A     Number of children: N/A     Years of education: N/A     Occupational History     Not on file.     Social History Main Topics     Smoking status: Former Smoker     Smokeless tobacco: Current User     Types: Chew     Alcohol use 5.0 - 6.0 oz/week     10 - 12 drink(s) per week     Drug use: No     Sexual activity: Not on file     Other Topics Concern     Not on file     Social History Narrative       Review of Systems  General:  Denies problem  Eyes: Denies problem  Ears/Nose/Throat: Denies problem  Cardiovascular: Denies problem  Respiratory:  Denies problem  Gastrointestinal:  Denies problem  Genitourinary: Denies problem  Musculoskeletal:  Denies problem  Skin: Denies problem  Neurologic: Denies problem  Psychiatric: Denies problem  Endocrine: Denies problem  Heme/Lymphatic: Denies problem   Allergic/Immunologic: Denies problem        Objective:     Vitals:    04/25/18 1444   BP: 112/72   Pulse: 78   Resp: 12   Temp: 97.9  F (36.6  C)   TempSrc: Oral   Weight: (!) 351 lb (159.2 kg)   Height: 6' 4.5\" (1.943 m)     Body mass index is 42.17 kg/(m^2).    Physical  General Appearance: Alert, cooperative, no distress, appears stated age  Head: Normocephalic, without obvious abnormality, atraumatic  Eyes: PERRL, conjunctiva/corneas clear, EOM's intact  Ears: Normal TM's and external ear canals, both ears  Nose: Nares normal, septum midline,mucosa normal, no drainage  Throat: Lips, mucosa, and tongue normal; teeth and gums normal  Neck: Supple, symmetrical, trachea midline, no adenopathy;  thyroid: not enlarged, symmetric, no " tenderness/mass/nodules; no carotid bruit or JVD  Back: Symmetric, no curvature, ROM normal, no CVA tenderness  Lungs: Clear to auscultation bilaterally, respirations unlabored  Heart: Regular rate and rhythm, S1 and S2 normal, no murmur, rub, or gallop,  Abdomen: Soft, non-tender, BMI  42  Genitourinary: deferred, no concerns  Musculoskeletal: Normal range of motion. Slight wrist swelling on left, normal ROM  Extremities: Extremities normal, atraumatic, no cyanosis or edema  Skin: Skin color, texture, turgor normal, no rashes or lesions  Lymph nodes: Cervical, supraclavicular, and axillary nodes normal  Neurologic: He is alert. He has normal reflexes.   Psychiatric: He has a normal mood and affect.

## 2021-06-17 NOTE — PATIENT INSTRUCTIONS - HE
Drink lots of liquid  Consider drinking tea mixed with honey  For the cough try robitussin or Delsym  Please call if not improving

## 2021-06-22 ENCOUNTER — COMMUNICATION - HEALTHEAST (OUTPATIENT)
Dept: FAMILY MEDICINE | Facility: CLINIC | Age: 56
End: 2021-06-22

## 2021-06-22 DIAGNOSIS — K21.9 GASTROESOPHAGEAL REFLUX DISEASE: ICD-10-CM

## 2021-06-22 RX ORDER — FAMOTIDINE 20 MG/1
20 TABLET, FILM COATED ORAL 2 TIMES DAILY PRN
Qty: 180 TABLET | Refills: 3 | Status: SHIPPED | OUTPATIENT
Start: 2021-06-22 | End: 2024-04-19

## 2021-06-25 NOTE — PROGRESS NOTES
Assessment/ Plan     1. Cough  2. Upper respiratory tract infection, unspecified type    Likely secondary to a viral upper respiratory infection  He initially had symptoms consistent with acute sinusitis but denies facial pressure or fever currently  He does have a cough but denies shortness of breath and notes some improvement over the past couple of days    Based on his clinical history recommend symptomatic treatment anticipate that his symptoms will continue to improve  He has received the Covid vaccine  Recommend fluids, rest, and tea mixed with honey as needed  He may take Robitussin or Delsym  Discussed that if he has ongoing symptoms recommend that he contact the clinic for consideration of an antibiotic  Discussed also that if his cough persists or worsens then can consider chest x-ray   Patient agrees to plan        Subjective:       Miky Mccann is a 55 y.o. male who presents to the clinic with a recent constellation of symptoms.  He has had a 2-week history of a constellation of symptoms that initially started with nasal congestion and typical sinusitis symptoms.  He did have some facial pressure.  He now feels that the infection has moved into his chest.  He has had a cough that has been nonproductive.  He states he is not having facial pain currently.  He initially had a sore throat which has improved.  He does have a history of gastroesophageal reflux symptoms as well as allergies.  He denies shortness of breath or loss of taste or smell.  He has received the Covid vaccine.  He does not have asthma or chronic obstructive pulmonary disease and is not a tobacco user.  Symptoms have actually improved over the past couple of days.    The following portions of the patient's history were reviewed and updated as appropriate: allergies, current medications, past family history, past medical history, past social history, past surgical history and problem list. Medications have been reconciled    Review  of Systems   A 12 point comprehensive review of systems was negative except as noted.      Current Outpatient Medications   Medication Sig Dispense Refill     famotidine (PEPCID) 20 MG tablet Take 1 tablet (20 mg total) by mouth 2 (two) times a day as needed for heartburn. 60 tablet 2     fexofenadine HCl (ALLEGRA ORAL) Take by mouth.       fluticasone propionate (FLONASE) 50 mcg/actuation nasal spray Apply 1 spray into each nostril daily.       No current facility-administered medications for this visit.        Objective:      /80 (Patient Site: Left Arm, Patient Position: Sitting, Cuff Size: Adult Large)   Pulse 64   Resp 16   Wt (!) 378 lb 3.2 oz (171.6 kg)   BMI 45.73 kg/m      General appearance: alert, appears stated age   He is not overtly breathless  Head: normocephalic, without obvious abnormality, atraumatic  Eyes: conjunctivae/corneas clear. PERRL, EOM's intact.   Ears: normal TM's and external ear canals both ears  Nose: nares normal. Septum midline. Mucosa normal.  Throat: lips, mucosa, and tongue normal; teeth and gums normal  Lungs: clear to auscultation bilaterally  Heart: regular rate and rhythm, S1, S2 normal, no murmur, click, rub or gallop  Extremities: extremities normal, atraumatic, no cyanosis or edema  Skin: skin color, texture, turgor normal  Lymph nodes: Cervical nodes normal.  Neurologic: Alert and oriented X 3           No results found for this or any previous visit (from the past 168 hour(s)).       This note has been dictated using voice recognition software. Any grammatical or context distortions are unintentional and inherent to the software    Dioni Ellsworth MD

## 2021-06-26 ENCOUNTER — HEALTH MAINTENANCE LETTER (OUTPATIENT)
Age: 56
End: 2021-06-26

## 2021-06-26 NOTE — TELEPHONE ENCOUNTER
Refill Approved    Rx renewed per Medication Renewal Policy. Medication was last renewed on 5.24.21.    Radha Fernando, [facility] Connection Triage/Med Refill 6/22/2021     Requested Prescriptions   Pending Prescriptions Disp Refills     famotidine (PEPCID) 20 MG tablet [Pharmacy Med Name: FAMOTIDINE 20 MG TABLET] 60 tablet 2     Sig: TAKE 1 TABLET (20 MG TOTAL) BY MOUTH 2 (TWO) TIMES A DAY AS NEEDED FOR HEARTBURN.       GI Medications Refill Protocol Passed - 6/21/2021  5:10 PM        Passed - PCP or prescribing provider visit in last 12 or next 3 months.     Last office visit with prescriber/PCP: Visit date not found OR same dept: 5/24/2021 Dioni Ellsworth MD OR same specialty: 5/24/2021 Dioni Ellsworth MD  Last physical: 11/25/2020 Last MTM visit: Visit date not found   Next visit within 3 mo: Visit date not found  Next physical within 3 mo: Visit date not found  Prescriber OR PCP: Ernst Phoenix MD  Last diagnosis associated with med order: 1. Gastroesophageal reflux disease  - famotidine (PEPCID) 20 MG tablet [Pharmacy Med Name: FAMOTIDINE 20 MG TABLET]; Take 1 tablet (20 mg total) by mouth 2 (two) times a day as needed for heartburn.  Dispense: 60 tablet; Refill: 2    If protocol passes may refill for 12 months if within 3 months of last provider visit (or a total of 15 months).

## 2021-07-06 VITALS
RESPIRATION RATE: 16 BRPM | DIASTOLIC BLOOD PRESSURE: 80 MMHG | HEART RATE: 64 BPM | BODY MASS INDEX: 45.73 KG/M2 | SYSTOLIC BLOOD PRESSURE: 138 MMHG | WEIGHT: 315 LBS

## 2021-08-17 ENCOUNTER — TRANSFERRED RECORDS (OUTPATIENT)
Dept: HEALTH INFORMATION MANAGEMENT | Facility: CLINIC | Age: 56
End: 2021-08-17

## 2021-10-16 ENCOUNTER — HEALTH MAINTENANCE LETTER (OUTPATIENT)
Age: 56
End: 2021-10-16

## 2022-02-23 ENCOUNTER — OFFICE VISIT (OUTPATIENT)
Dept: FAMILY MEDICINE | Facility: CLINIC | Age: 57
End: 2022-02-23
Payer: COMMERCIAL

## 2022-02-23 VITALS
DIASTOLIC BLOOD PRESSURE: 80 MMHG | BODY MASS INDEX: 38.36 KG/M2 | SYSTOLIC BLOOD PRESSURE: 136 MMHG | RESPIRATION RATE: 16 BRPM | HEIGHT: 76 IN | WEIGHT: 315 LBS | HEART RATE: 74 BPM

## 2022-02-23 DIAGNOSIS — Z12.11 SCREEN FOR COLON CANCER: ICD-10-CM

## 2022-02-23 DIAGNOSIS — Z12.11 COLON CANCER SCREENING: ICD-10-CM

## 2022-02-23 DIAGNOSIS — G62.9 NEUROPATHY: Primary | ICD-10-CM

## 2022-02-23 PROCEDURE — 99213 OFFICE O/P EST LOW 20 MIN: CPT | Performed by: FAMILY MEDICINE

## 2022-02-23 RX ORDER — LANOLIN ALCOHOL/MO/W.PET/CERES
1000 CREAM (GRAM) TOPICAL DAILY
COMMUNITY
End: 2024-05-06

## 2022-02-23 RX ORDER — IBUPROFEN 200 MG
800 TABLET ORAL 2 TIMES DAILY
COMMUNITY
End: 2024-04-19

## 2022-02-26 NOTE — PROGRESS NOTES
"  Assessment & Plan     Neuropathy  Patient expressing symptoms of neuropathy in the bilateral toes for many months.  Symptoms not seem to leave the toes to the rest of the foot but stay in the toes.  No recent trauma or back injury  Discussed starting with blood work to further evaluate possibilities of causing neuropathy and follow-up based on labs  Negative laboratory work-up consideration starting nerve blocking medication such as gabapentin  Patient to return when fasting and obtain his normal yearly labs  - Comprehensive metabolic panel; Future  - Lipid panel reflex to direct LDL Fasting; Future  - Prostate Specific Antigen Screen; Future  - TSH with free T4 reflex; Future  - Vitamin B12; Future  - CBC with platelets; Future    Screen for colon cancer  Patient due for colon cancer screening would like to pursue Cologuard    Colon cancer screening  Please see above  - COLOGUARD([employer])         BMI:   Estimated body mass index is 45.43 kg/m  as calculated from the following:    Height as of this encounter: 1.935 m (6' 4.18\").    Weight as of this encounter: 170.1 kg (375 lb).       No follow-ups on file.    Alexandro Tan MD  [facility]    Dalton Bolaños is a 56 year old who presents for the following health issues    Musculoskeletal Problem    History of Present Illness     Reason for visit:  Foot pain  Symptom onset:  More than a month  Symptoms include:  Sharp pain and numbness of toes  Symptom intensity:  Moderate  Symptom progression:  Worsening  Had these symptoms before:  No    He eats 2-3 servings of fruits and vegetables daily.He consumes 0 sweetened beverage(s) daily.He exercises with enough effort to increase his heart rate 30 to 60 minutes per day.  He exercises with enough effort to increase his heart rate 3 or less days per week.   He is taking medications regularly.     56-year-old male here for evaluation of bilateral toes causing neuropathy " "symptoms-he has had this problem for many months-discussed with him starting with a laboratory work-up to look for causes of the neuropathy.  If no causes are found with blood work consideration for medication to block the symptoms.  Discussed proper shoe wear has some of his boots he wears for work could be exacerbating some of his symptoms.  Patient is agreement to plan    Review of Systems   Constitutional, HEENT, cardiovascular, pulmonary, gi and gu systems are negative, except as otherwise noted.      Objective    /80 (BP Location: Left arm, Patient Position: Sitting, Cuff Size: Adult Large)   Pulse 74   Resp 16   Ht 1.935 m (6' 4.18\")   Wt (!) 170.1 kg (375 lb)   BMI 45.43 kg/m    Body mass index is 45.43 kg/m .  Physical Exam   GENERAL: healthy, alert and no distress  EYES: Eyes grossly normal to inspection, PERRL and conjunctivae and sclerae normal  RESP: lungs clear to auscultation - no rales, rhonchi or wheezes  CV: regular rate and rhythm, normal S1 S2, no S3 or S4, no murmur, click or rub, no peripheral edema and peripheral pulses strong  MS: no gross musculoskeletal defects noted, no edema  NEURO: Normal strength and tone, sensory exam grossly normal, mentation intact and expresses tingling at the toes bilateral feet          "

## 2022-03-01 ENCOUNTER — ALLIED HEALTH/NURSE VISIT (OUTPATIENT)
Dept: FAMILY MEDICINE | Facility: CLINIC | Age: 57
End: 2022-03-01
Payer: COMMERCIAL

## 2022-03-01 ENCOUNTER — LAB (OUTPATIENT)
Dept: LAB | Facility: CLINIC | Age: 57
End: 2022-03-01
Payer: COMMERCIAL

## 2022-03-01 DIAGNOSIS — Z23 NEED FOR TD VACCINE: Primary | ICD-10-CM

## 2022-03-01 DIAGNOSIS — G62.9 NEUROPATHY: ICD-10-CM

## 2022-03-01 DIAGNOSIS — Z23 NEED FOR TD VACCINE: ICD-10-CM

## 2022-03-01 LAB
ALBUMIN SERPL-MCNC: 3.8 G/DL (ref 3.5–5)
ALP SERPL-CCNC: 62 U/L (ref 45–120)
ALT SERPL W P-5'-P-CCNC: 26 U/L (ref 0–45)
ANION GAP SERPL CALCULATED.3IONS-SCNC: 9 MMOL/L (ref 5–18)
AST SERPL W P-5'-P-CCNC: 17 U/L (ref 0–40)
BILIRUB SERPL-MCNC: 0.4 MG/DL (ref 0–1)
BUN SERPL-MCNC: 10 MG/DL (ref 8–22)
CALCIUM SERPL-MCNC: 9.1 MG/DL (ref 8.5–10.5)
CHLORIDE BLD-SCNC: 101 MMOL/L (ref 98–107)
CHOLEST SERPL-MCNC: 141 MG/DL
CO2 SERPL-SCNC: 27 MMOL/L (ref 22–31)
CREAT SERPL-MCNC: 0.91 MG/DL (ref 0.7–1.3)
ERYTHROCYTE [DISTWIDTH] IN BLOOD BY AUTOMATED COUNT: 12.8 % (ref 10–15)
FASTING STATUS PATIENT QL REPORTED: YES
GFR SERPL CREATININE-BSD FRML MDRD: >90 ML/MIN/1.73M2
GLUCOSE BLD-MCNC: 112 MG/DL (ref 70–125)
HCT VFR BLD AUTO: 42.8 % (ref 40–53)
HDLC SERPL-MCNC: 34 MG/DL
HGB BLD-MCNC: 13.8 G/DL (ref 13.3–17.7)
LDLC SERPL CALC-MCNC: 74 MG/DL
MCH RBC QN AUTO: 28.6 PG (ref 26.5–33)
MCHC RBC AUTO-ENTMCNC: 32.2 G/DL (ref 31.5–36.5)
MCV RBC AUTO: 89 FL (ref 78–100)
PLATELET # BLD AUTO: 186 10E3/UL (ref 150–450)
POTASSIUM BLD-SCNC: 4.8 MMOL/L (ref 3.5–5)
PROT SERPL-MCNC: 6.2 G/DL (ref 6–8)
PSA SERPL-MCNC: 0.65 UG/L (ref 0–3.5)
RBC # BLD AUTO: 4.83 10E6/UL (ref 4.4–5.9)
SODIUM SERPL-SCNC: 137 MMOL/L (ref 136–145)
TRIGL SERPL-MCNC: 164 MG/DL
TSH SERPL DL<=0.005 MIU/L-ACNC: 3.03 UIU/ML (ref 0.3–5)
VIT B12 SERPL-MCNC: 561 PG/ML (ref 213–816)
WBC # BLD AUTO: 4.7 10E3/UL (ref 4–11)

## 2022-03-01 PROCEDURE — 99207 PR NO CHARGE NURSE ONLY: CPT

## 2022-03-01 PROCEDURE — 36415 COLL VENOUS BLD VENIPUNCTURE: CPT

## 2022-03-01 PROCEDURE — 85027 COMPLETE CBC AUTOMATED: CPT

## 2022-03-01 PROCEDURE — 80053 COMPREHEN METABOLIC PANEL: CPT

## 2022-03-01 PROCEDURE — G0103 PSA SCREENING: HCPCS

## 2022-03-01 PROCEDURE — 90471 IMMUNIZATION ADMIN: CPT

## 2022-03-01 PROCEDURE — 84443 ASSAY THYROID STIM HORMONE: CPT

## 2022-03-01 PROCEDURE — 80061 LIPID PANEL: CPT

## 2022-03-01 PROCEDURE — 90714 TD VACC NO PRESV 7 YRS+ IM: CPT

## 2022-03-01 PROCEDURE — 82607 VITAMIN B-12: CPT

## 2022-03-21 LAB — COLOGUARD-ABSTRACT: POSITIVE

## 2022-03-29 ENCOUNTER — TELEPHONE (OUTPATIENT)
Dept: FAMILY MEDICINE | Facility: CLINIC | Age: 57
End: 2022-03-29
Payer: COMMERCIAL

## 2022-03-29 ENCOUNTER — TRANSFERRED RECORDS (OUTPATIENT)
Dept: HEALTH INFORMATION MANAGEMENT | Facility: CLINIC | Age: 57
End: 2022-03-29
Payer: COMMERCIAL

## 2022-03-29 DIAGNOSIS — Z12.11 COLON CANCER SCREENING: Primary | ICD-10-CM

## 2022-03-29 NOTE — TELEPHONE ENCOUNTER
Informed patient of message below. Patient states understanding.    Patient agrees to getting a colonoscopy.

## 2022-03-29 NOTE — TELEPHONE ENCOUNTER
----- Message from Alexandro Tan MD sent at 3/29/2022 11:24 AM CDT -----  Please inform patient that his Cologuard test was positive.  This does not mean that he has colon cancer but has an increased risk of developing it-it is recommended that he complete a colonoscopy.  If he would like to pursue this please let me know and I will place order for colonoscopy.

## 2022-04-22 ENCOUNTER — OFFICE VISIT (OUTPATIENT)
Dept: FAMILY MEDICINE | Facility: CLINIC | Age: 57
End: 2022-04-22
Payer: COMMERCIAL

## 2022-04-22 VITALS
SYSTOLIC BLOOD PRESSURE: 134 MMHG | DIASTOLIC BLOOD PRESSURE: 87 MMHG | HEART RATE: 61 BPM | WEIGHT: 315 LBS | BODY MASS INDEX: 44.95 KG/M2 | RESPIRATION RATE: 20 BRPM

## 2022-04-22 DIAGNOSIS — Z53.9 ERRONEOUS ENCOUNTER--DISREGARD: Primary | ICD-10-CM

## 2022-06-20 ENCOUNTER — TRANSFERRED RECORDS (OUTPATIENT)
Dept: FAMILY MEDICINE | Facility: CLINIC | Age: 57
End: 2022-06-20

## 2022-06-20 LAB — HEMOCCULT STL QL IA: NEGATIVE

## 2022-07-23 ENCOUNTER — HEALTH MAINTENANCE LETTER (OUTPATIENT)
Age: 57
End: 2022-07-23

## 2022-07-25 ENCOUNTER — OFFICE VISIT (OUTPATIENT)
Dept: FAMILY MEDICINE | Facility: CLINIC | Age: 57
End: 2022-07-25
Payer: COMMERCIAL

## 2022-07-25 VITALS
SYSTOLIC BLOOD PRESSURE: 138 MMHG | BODY MASS INDEX: 45.1 KG/M2 | HEART RATE: 81 BPM | WEIGHT: 315 LBS | DIASTOLIC BLOOD PRESSURE: 86 MMHG | RESPIRATION RATE: 16 BRPM | OXYGEN SATURATION: 97 % | TEMPERATURE: 97.9 F

## 2022-07-25 DIAGNOSIS — R19.4 CHANGE IN BOWEL HABITS: ICD-10-CM

## 2022-07-25 DIAGNOSIS — R30.0 DYSURIA: Primary | ICD-10-CM

## 2022-07-25 DIAGNOSIS — N50.812 PAIN IN LEFT TESTICLE: ICD-10-CM

## 2022-07-25 DIAGNOSIS — R10.84 ABDOMINAL PAIN, GENERALIZED: ICD-10-CM

## 2022-07-25 LAB
ALBUMIN UR-MCNC: NEGATIVE MG/DL
APPEARANCE UR: CLEAR
BILIRUB UR QL STRIP: NEGATIVE
COLOR UR AUTO: YELLOW
GLUCOSE UR STRIP-MCNC: NEGATIVE MG/DL
HGB UR QL STRIP: NEGATIVE
KETONES UR STRIP-MCNC: NEGATIVE MG/DL
LEUKOCYTE ESTERASE UR QL STRIP: NEGATIVE
NITRATE UR QL: NEGATIVE
PH UR STRIP: 5.5 [PH] (ref 5–8)
SP GR UR STRIP: 1.01 (ref 1–1.03)
UROBILINOGEN UR STRIP-ACNC: 1 E.U./DL

## 2022-07-25 PROCEDURE — 81003 URINALYSIS AUTO W/O SCOPE: CPT | Performed by: FAMILY MEDICINE

## 2022-07-25 PROCEDURE — 99214 OFFICE O/P EST MOD 30 MIN: CPT | Performed by: FAMILY MEDICINE

## 2022-07-25 NOTE — PATIENT INSTRUCTIONS
I am ordering a colonoscopy with our [facility] surgeons at the [facility].  If you don t hear from a representative within 2 business days, please call [phone].    I am ordering a CT of the abdomen and pelvis because of the upper and lower abdominal pain/discomfort.    I am ordering a scrotal/testicular ultrasound because of this his testicular pain.    For the CAT scan and the ultrasound radiology should call you but if they do not you can call [phone] to set that up at [facility].    Urinalysis negative today.    If you are interested in the new shingles shot, Shingrix, please check with insurance for coverage either in clinic or at a pharmacy. It is a 2 shot series 2-6 months apart.     Declined Covid booster.

## 2022-07-25 NOTE — PROGRESS NOTES
"  Assessment & Plan       ICD-10-CM    1. Dysuria  R30.0 UA macro with reflex to Microscopic and Culture - Clinc Collect   2. Abdominal pain, generalized  R10.84 Colonscopy Screening  Referral     CT Abdomen Pelvis w Contrast   3. Change in bowel habits  R19.4 Colonscopy Screening  Referral   4. Pain in left testicle  N50.812 US Testicular & Scrotum w Doppler Ltd     I am ordering a colonoscopy with our [facility] surgeons at the [facility].  If you don t hear from a representative within 2 business days, please call [phone].    I am ordering a CT of the abdomen and pelvis because of the upper and lower abdominal pain/discomfort.    I am ordering a scrotal/testicular ultrasound because of this his testicular pain.    For the CAT scan and the ultrasound radiology should call you but if they do not you can call [phone] to set that up at [facility].    Urinalysis negative today.    If you are interested in the new shingles shot, Shingrix, please check with insurance for coverage either in clinic or at a pharmacy. It is a 2 shot series 2-6 months apart.     Declined Covid booster.      30 minutes spent on the date of the encounter doing chart review, history and exam, documentation and further activities per the note       BMI:   Estimated body mass index is 45.1 kg/m  as calculated from the following:    Height as of 2/23/22: 1.935 m (6' 4.18\").    Weight as of this encounter: 168.9 kg (372 lb 4 oz).           No follow-ups on file.    Sarah Correa MD  [facility] Bolaños is a 56 year old, presenting for the following health issues:  Gastrointestinal Problem (Increased gas/unable to pass gas x 1-2 months, pain in lower abdomen into groin/testicles, trouble with bowel movements )    CC:  Increased gas/unable to pass gas x 1-2 months, pain in lower abdomen into groin/testicles, trouble with bowel movements "     History of Present Illness       Reason for visit:  Digestive  Symptoms include:  Slight pain  Symptom intensity:  Moderate  Symptom progression:  Staying the same  Had these symptoms before:  No    He eats 2-3 servings of fruits and vegetables daily.He consumes 0 sweetened beverage(s) daily.He exercises with enough effort to increase his heart rate 20 to 29 minutes per day.  He exercises with enough effort to increase his heart rate 3 or less days per week.   He is taking medications regularly.     Abdominal pain:  Upper and lower abdominal pain.  Feels like a string from left low abdomen to testicle and to rectum. Sometime unbearable pain in the rectal are and other times low grade. No bulge or hernia seem.  Symptoms for 1 month. Discomfort with urinating, not with BM.  Some change to BM for 1.5 years. Not as regular as they used to be.  No nausea or vomiting.  No blood in stool or per rectum.  Severe gas build up in stomach.  All pain below belly button,  No teticular mass noted. No blood in urine. Not up more than 1 time at night to urinate, no decreased force of stream.  Also pain in upper abdomen and wonders if gas buildup.  This can be quite painful.  Change in bowel habits include less frequent bowel movements.  His left lower abdominal pain shoots into his left testicle and into his rectum.                Review of Systems         Objective    /86 (BP Location: Left arm, Patient Position: Sitting, Cuff Size: Adult Large)   Pulse 81   Temp 97.9  F (36.6  C) (Oral)   Resp 16   Wt (!) 168.9 kg (372 lb 4 oz)   SpO2 97%   BMI 45.10 kg/m    Body mass index is 45.1 kg/m .  Physical Exam   GENERAL: healthy, alert and no distress  ABDOMEN: soft, nontender, no hepatosplenomegaly, no masses and bowel sounds normal   (male): normal male genitalia without lesions or urethral discharge, no hernia, no palpable testicular mass bilaterally                    .  ..

## 2022-08-05 ENCOUNTER — HOSPITAL ENCOUNTER (OUTPATIENT)
Dept: ULTRASOUND IMAGING | Facility: HOSPITAL | Age: 57
Discharge: HOME | End: 2022-08-05
Attending: FAMILY MEDICINE
Payer: COMMERCIAL

## 2022-08-05 ENCOUNTER — HOSPITAL ENCOUNTER (OUTPATIENT)
Dept: CT IMAGING | Facility: HOSPITAL | Age: 57
Discharge: HOME | End: 2022-08-05
Attending: FAMILY MEDICINE
Payer: COMMERCIAL

## 2022-08-05 DIAGNOSIS — N50.812 PAIN IN LEFT TESTICLE: ICD-10-CM

## 2022-08-05 DIAGNOSIS — R10.84 ABDOMINAL PAIN, GENERALIZED: ICD-10-CM

## 2022-08-05 PROCEDURE — 74177 CT ABD & PELVIS W/CONTRAST: CPT

## 2022-08-05 PROCEDURE — 255N000002 HC RX 255 OP 636: Performed by: FAMILY MEDICINE

## 2022-08-05 PROCEDURE — 76870 US EXAM SCROTUM: CPT

## 2022-08-05 RX ADMIN — IOHEXOL 100 ML: 350 INJECTION, SOLUTION INTRAVENOUS at 08:10

## 2022-09-13 ENCOUNTER — ANESTHESIA EVENT (OUTPATIENT)
Dept: SURGERY | Facility: AMBULATORY SURGERY CENTER | Age: 57
End: 2022-09-13
Payer: COMMERCIAL

## 2022-09-14 ENCOUNTER — HOSPITAL ENCOUNTER (OUTPATIENT)
Facility: AMBULATORY SURGERY CENTER | Age: 57
Discharge: HOME | End: 2022-09-14
Attending: SURGERY
Payer: COMMERCIAL

## 2022-09-14 ENCOUNTER — ANESTHESIA (OUTPATIENT)
Dept: SURGERY | Facility: AMBULATORY SURGERY CENTER | Age: 57
End: 2022-09-14
Payer: COMMERCIAL

## 2022-09-14 VITALS
HEART RATE: 58 BPM | RESPIRATION RATE: 18 BRPM | HEIGHT: 76 IN | DIASTOLIC BLOOD PRESSURE: 63 MMHG | BODY MASS INDEX: 38.36 KG/M2 | TEMPERATURE: 97.4 F | SYSTOLIC BLOOD PRESSURE: 120 MMHG | WEIGHT: 315 LBS | OXYGEN SATURATION: 95 %

## 2022-09-14 DIAGNOSIS — R19.4 CHANGE IN BOWEL HABITS: ICD-10-CM

## 2022-09-14 DIAGNOSIS — R10.84 PAIN, ABDOMINAL, GENERALIZED: ICD-10-CM

## 2022-09-14 PROCEDURE — 45380 COLONOSCOPY AND BIOPSY: CPT | Mod: PT | Performed by: SURGERY

## 2022-09-14 RX ORDER — LIDOCAINE 40 MG/G
CREAM TOPICAL
Status: DISCONTINUED | OUTPATIENT
Start: 2022-09-14 | End: 2022-09-17 | Stop reason: HOSPADM

## 2022-09-14 RX ORDER — OXYCODONE HYDROCHLORIDE 5 MG/1
5 TABLET ORAL EVERY 4 HOURS PRN
Status: DISCONTINUED | OUTPATIENT
Start: 2022-09-14 | End: 2022-09-17 | Stop reason: HOSPADM

## 2022-09-14 RX ORDER — HYDROMORPHONE HCL IN WATER/PF 6 MG/30 ML
0.2 PATIENT CONTROLLED ANALGESIA SYRINGE INTRAVENOUS EVERY 5 MIN PRN
Status: DISCONTINUED | OUTPATIENT
Start: 2022-09-14 | End: 2022-09-17 | Stop reason: HOSPADM

## 2022-09-14 RX ORDER — ONDANSETRON 2 MG/ML
4 INJECTION INTRAMUSCULAR; INTRAVENOUS EVERY 30 MIN PRN
Status: DISCONTINUED | OUTPATIENT
Start: 2022-09-14 | End: 2022-09-17 | Stop reason: HOSPADM

## 2022-09-14 RX ORDER — KETAMINE HYDROCHLORIDE 10 MG/ML
INJECTION INTRAMUSCULAR; INTRAVENOUS PRN
Status: DISCONTINUED | OUTPATIENT
Start: 2022-09-14 | End: 2022-09-14

## 2022-09-14 RX ORDER — FENTANYL CITRATE 0.05 MG/ML
25 INJECTION, SOLUTION INTRAMUSCULAR; INTRAVENOUS EVERY 5 MIN PRN
Status: DISCONTINUED | OUTPATIENT
Start: 2022-09-14 | End: 2022-09-17 | Stop reason: HOSPADM

## 2022-09-14 RX ORDER — FENTANYL CITRATE 0.05 MG/ML
25 INJECTION, SOLUTION INTRAMUSCULAR; INTRAVENOUS
Status: DISCONTINUED | OUTPATIENT
Start: 2022-09-14 | End: 2022-09-17 | Stop reason: HOSPADM

## 2022-09-14 RX ORDER — PROPOFOL 10 MG/ML
INJECTION, EMULSION INTRAVENOUS PRN
Status: DISCONTINUED | OUTPATIENT
Start: 2022-09-14 | End: 2022-09-14

## 2022-09-14 RX ORDER — MEPERIDINE HYDROCHLORIDE 25 MG/ML
12.5 INJECTION INTRAMUSCULAR; INTRAVENOUS; SUBCUTANEOUS
Status: DISCONTINUED | OUTPATIENT
Start: 2022-09-14 | End: 2022-09-17 | Stop reason: HOSPADM

## 2022-09-14 RX ORDER — ONDANSETRON 2 MG/ML
INJECTION INTRAMUSCULAR; INTRAVENOUS PRN
Status: DISCONTINUED | OUTPATIENT
Start: 2022-09-14 | End: 2022-09-14

## 2022-09-14 RX ORDER — SODIUM CHLORIDE, SODIUM LACTATE, POTASSIUM CHLORIDE, CALCIUM CHLORIDE 600; 310; 30; 20 MG/100ML; MG/100ML; MG/100ML; MG/100ML
INJECTION, SOLUTION INTRAVENOUS CONTINUOUS
Status: DISCONTINUED | OUTPATIENT
Start: 2022-09-14 | End: 2022-09-17 | Stop reason: HOSPADM

## 2022-09-14 RX ORDER — ONDANSETRON 4 MG/1
4 TABLET, ORALLY DISINTEGRATING ORAL EVERY 30 MIN PRN
Status: DISCONTINUED | OUTPATIENT
Start: 2022-09-14 | End: 2022-09-17 | Stop reason: HOSPADM

## 2022-09-14 RX ORDER — GLYCOPYRROLATE 0.2 MG/ML
INJECTION, SOLUTION INTRAMUSCULAR; INTRAVENOUS PRN
Status: DISCONTINUED | OUTPATIENT
Start: 2022-09-14 | End: 2022-09-14

## 2022-09-14 RX ORDER — PROPOFOL 10 MG/ML
INJECTION, EMULSION INTRAVENOUS CONTINUOUS PRN
Status: DISCONTINUED | OUTPATIENT
Start: 2022-09-14 | End: 2022-09-14

## 2022-09-14 RX ADMIN — ONDANSETRON 4 MG: 2 INJECTION INTRAMUSCULAR; INTRAVENOUS at 11:31

## 2022-09-14 RX ADMIN — GLYCOPYRROLATE 0.2 MG: 0.2 INJECTION, SOLUTION INTRAMUSCULAR; INTRAVENOUS at 11:51

## 2022-09-14 RX ADMIN — PROPOFOL 30 MG: 10 INJECTION, EMULSION INTRAVENOUS at 11:34

## 2022-09-14 RX ADMIN — PROPOFOL 50 MG: 10 INJECTION, EMULSION INTRAVENOUS at 11:23

## 2022-09-14 RX ADMIN — SODIUM CHLORIDE, SODIUM LACTATE, POTASSIUM CHLORIDE, CALCIUM CHLORIDE: 600; 310; 30; 20 INJECTION, SOLUTION INTRAVENOUS at 12:22

## 2022-09-14 RX ADMIN — SODIUM CHLORIDE, SODIUM LACTATE, POTASSIUM CHLORIDE, CALCIUM CHLORIDE: 600; 310; 30; 20 INJECTION, SOLUTION INTRAVENOUS at 10:11

## 2022-09-14 RX ADMIN — PROPOFOL 200 MCG/KG/MIN: 10 INJECTION, EMULSION INTRAVENOUS at 11:24

## 2022-09-14 RX ADMIN — KETAMINE HYDROCHLORIDE 20 MG: 10 INJECTION INTRAMUSCULAR; INTRAVENOUS at 11:51

## 2022-09-14 RX ADMIN — KETAMINE HYDROCHLORIDE 10 MG: 10 INJECTION INTRAMUSCULAR; INTRAVENOUS at 12:22

## 2022-09-14 NOTE — ANESTHESIA PREPROCEDURE EVALUATION
Anesthesia Pre-Procedure Evaluation    Patient: Miky Mccann   MRN: [id number] : 1965        Procedure : Procedure(s):  COLONOSCOPY          Past Medical History:   Diagnosis Date     Gastroesophageal reflux disease       History reviewed. No pertinent surgical history.   No Known Allergies   Social History     Tobacco Use     Smoking status: Former Smoker     Packs/day: 1.00     Years: 10.00     Pack years: 10.00     Smokeless tobacco: Former User     Types: Chew     Quit date: 2019   Substance Use Topics     Alcohol use: Yes     Alcohol/week: 8.3 - 10.0 standard drinks      Wt Readings from Last 1 Encounters:   22 (!) 168.9 kg (372 lb 5.7 oz)        Anesthesia Evaluation   Pt has had prior anesthetic.         ROS/MED HX  ENT/Pulmonary:  - neg pulmonary ROS     Neurologic:  - neg neurologic ROS     Cardiovascular:  - neg cardiovascular ROS     METS/Exercise Tolerance:     Hematologic:  - neg hematologic  ROS     Musculoskeletal:  - neg musculoskeletal ROS     GI/Hepatic:  - neg GI/hepatic ROS   (+) GERD, hepatitis liver disease,     Renal/Genitourinary:  - neg Renal ROS     Endo:  - neg endo ROS   (+) Obesity,     Psychiatric/Substance Use:  - neg psychiatric ROS     Infectious Disease:  - neg infectious disease ROS     Malignancy:  - neg malignancy ROS     Other:  - neg other ROS          Physical Exam    Airway  airway exam normal      Mallampati: II       Respiratory Devices and Support         Dental  no notable dental history         Cardiovascular   cardiovascular exam normal       Rhythm and rate: regular and normal     Pulmonary   pulmonary exam normal        breath sounds clear to auscultation           OUTSIDE LABS:  CBC:   Lab Results   Component Value Date    WBC 4.7 2022    WBC 3.6 (L) 2020    HGB 13.8 2022    HGB 14.3 2020    HCT 42.8 2022    HCT 42.8 2020     2022     2020     BMP:   Lab Results   Component Value  Date     03/01/2022     11/25/2020    POTASSIUM 4.8 03/01/2022    POTASSIUM 4.8 11/25/2020    CHLORIDE 101 03/01/2022    CHLORIDE 107 11/25/2020    CO2 27 03/01/2022    CO2 23 11/25/2020    BUN 10 03/01/2022    BUN 14 11/25/2020    CR 0.91 03/01/2022    CR 0.82 11/25/2020     03/01/2022     11/25/2020     COAGS: No results found for: PTT, INR, FIBR  POC: No results found for: BGM, HCG, HCGS  HEPATIC:   Lab Results   Component Value Date    ALBUMIN 3.8 03/01/2022    PROTTOTAL 6.2 03/01/2022    ALT 26 03/01/2022    AST 17 03/01/2022    ALKPHOS 62 03/01/2022    BILITOTAL 0.4 03/01/2022     OTHER:   Lab Results   Component Value Date    A1C 6.0 08/27/2013    MARC 9.1 03/01/2022    TSH 3.03 03/01/2022       Anesthesia Plan    ASA Status:  3      Anesthesia Type: MAC.   Induction: Intravenous, Propofol.   Maintenance: TIVA.        Consents    Anesthesia Plan(s) and associated risks, benefits, and realistic alternatives discussed. Questions answered and patient/representative(s) expressed understanding.    - Discussed:     - Discussed with:  Patient      - Extended Intubation/Ventilatory Support Discussed: No.      - Patient is DNR/DNI Status: No    Use of blood products discussed: No .     Postoperative Care    Pain management: IV analgesics.   PONV prophylaxis: Ondansetron (or other 5HT-3), Dexamethasone or Solumedrol     Comments:    Other Comments: Propofol infusion            Jaime Nunez MD

## 2022-09-14 NOTE — ANESTHESIA CARE TRANSFER NOTE
Patient: Miky Mccann    Procedure: Procedure(s):  COLONOSCOPY AND POLYPECTOMY       Diagnosis: Pain, abdominal, generalized [R10.84]  Change in bowel habits [R19.4]  Diagnosis Additional Information: No value filed.    Anesthesia Type:   MAC     Note:    Oropharynx: oropharynx clear of all foreign objects and spontaneously breathing  Level of Consciousness: drowsy  Oxygen Supplementation: face mask  Level of Supplemental Oxygen (L/min / FiO2): 6  Independent Airway: airway patency satisfactory and stable  Dentition: dentition unchanged  Vital Signs Stable: post-procedure vital signs reviewed and stable  Report to RN Given: handoff report given  Patient transferred to: PACU    Handoff Report: Identifed the Patient, Identified the Reponsible Provider, Reviewed the pertinent medical history, Discussed the surgical course, Reviewed Intra-OP anesthesia mangement and issues during anesthesia, Set expectations for post-procedure period and Allowed opportunity for questions and acknowledgement of understanding      Vitals:  Vitals Value Taken Time   /79 09/14/22 1235   Temp 96.8  F (36  C) 09/14/22 1235   Pulse 57 09/14/22 1235   Resp 20 09/14/22 1235   SpO2 97 % 09/14/22 1235       Electronically Signed By: BETY Ferrell CRNA  September 14, 2022  12:36 PM

## 2022-09-14 NOTE — OP NOTE
COLONOSCOPY REPORT      Pre-op Dx:              Screening colonoscopy      Procedure:             Colonoscopy      Indications:            Colon Cancer Screening      Findings:                Multiple polyps    Procedure:              The patient was brought to the endoscopy suite placed, in a left lateral position, moderate sedation anesthesia was initiated.  After a procedural timeout, we began by performing a digital rectal exam which was remarkable.  The colonoscope was advanced atraumatically into the anus taken all way up and around to the cecum utilizing water insufflation.  Advancing to the cecum was quite difficult due to significant looping in the sigmoid colon.  The ileocecal valve and the appendiceal orifice are clearly identified.  On withdrawal, the findings for the segments of colon are as follows:   Cecum: Good quality prep, no polyps appreciated  Ascending colon: Good quality prep, one 3 to 4 mm sessile appearing lesion removed successfully with a snare and fully retrieved  Hepatic flexure: Good quality prep, no polyps appreciated  Transverse colon: Quality prep, no polyps appreciated  Splenic flexure: Good quality prep, no polyps appreciated  Descending colon: Good quality prep, no polyps appreciated  Sigmoid colon: Quality prep, 1 sessile and 2 pedunculated polyps in the 2 to 4 mm range identified and removed with cold snare.  Minimal diverticular disease  Rectum: Good quality prep, no polyps appreciated      Withdrawal Time:  20 min    EBL:                        None      Medications:         MAC; see anesthesia note for details          Complications:      None      Post-op Dx:            Multiple colonic polyps      Recommendation:   Next Colonoscopy in 5 years      Ramon Henry MD  9/14/2022 12:30 PM  [facility]  [phone]

## 2022-09-14 NOTE — DISCHARGE INSTRUCTIONS
If you have any questions or concerns please contact the provider that performed your procedure, call Dr. Henry, his office number is [phone].    Colonoscopy Discharge Instructions:    -Take your medications as directed and follow up with you primary provider as scheduled.   -You should expect to pass air from your rectum after the procedure.   -Follow these care guidelines during your recovery for the next 24 hours.     You were given medicine for sedation:    You have been given medicines during your procedure that might make you sleepy and weak. To prevent problems:    *Rest for the rest of the day after you are home. You should be back to your normal activity tomorrow.    The medicines used for sedation may make you feel nauseated.   *Start with clear liquids, such as tea, jell-o, broth and ginger ale. As you feel better you may add soft foods such as pudding and ice cream.   *When you no longer feel nauseated, you may try your normal diet.     You should be back to eating your normal after 24 hours.     Call if you have any of these problems:    *Fever of 101.5 degree F or 38.6 degrees C  *Bleeding from the rectum  *Black stool or blood in your bowel movements  *Nausea with vomiting that does not ease after a few hours.  *Abdominal pain or bloating  *Fainting    Coping with pain    *Pain after surgery is normal and expected*    If you have pain after surgery, pain medicine will help you feel better. Take it as told, before pain becomes severe. Also, ask your healthcare provider or pharmacist about other ways to control pain. This might be with heat, ice, or relaxation. And follow any other instructions your surgeon or nurse gives you.    Tips for taking pain medicine    To get the best relief possible, remember these points:    Pain medicines can upset your stomach. Taking them with a little food may help.  Most pain relievers taken by mouth need at least 20 to 30 minutes to start to work.  Don't wait till  your pain becomes severe before you take your medicine. Try to time your medicine so that you can take it before starting an activity. This might be before you get dressed, go for a walk, or sit down for dinner.  Constipation is a common side effect of pain medicines. You can take medicines such as laxatives (Miralax) or stool softeners to help ease constipation. Drinking lots of fluids and eating foods such as fruits and vegetables that are high in fiber can also help.  Drinking alcohol and taking pain medicine can cause dizziness and slow your breathing. It can even be deadly. Don't drink alcohol while taking pain medicine.  Pain medicine can make you react more slowly to things. Don't drive or run machinery while taking pain medicine.  Your healthcare provider may tell you to take acetaminophen to help ease your pain. Ask him or her how much you are supposed to take each day. Acetaminophen or other pain relievers may interact with your prescription medicines or other over-the-counter (OTC) medicines. Some prescription medicines have acetaminophen and other ingredients. Using both prescription and OTC acetaminophen for pain can cause you to overdose. Read the labels on your OTC medicines with care. This will help you to clearly know the list of ingredients, how much to take, and any warnings. It may also help you not take too much acetaminophen. If you have questions or don't understand the information, ask your pharmacist or healthcare provider to explain it to you before you take the OTC medicine.    Discharge Instructions: After Your Surgery, regarding Anesthesia    You ve just had surgery. During surgery, you were given medicine called anesthesia to keep you relaxed and free of pain. After surgery, you may have some pain or nausea. This is common. Here are some tips for feeling better and getting well after surgery.    Going home    Your healthcare provider will show you how to take care of yourself when you  go home. He or she will also answer your questions. Have an adult family member or friend drive you home. For the first 24 hours after your surgery:    Don't drive or use heavy equipment.  Don't make important decisions or sign legal papers.  Don't drink alcohol.  Have an adult stay with you for the next 24 hours. He or she can watch for problems and help keep you safe.  Be sure to go to all follow-up visits with your healthcare provider. And rest after your surgery for as long as your healthcare provider tells you to.    Managing nausea    Some people have an upset stomach after surgery. This is often because of anesthesia, pain, or pain medicine, or the stress of surgery. These tips will help you handle nausea and eat healthy foods as you get better. If you were on a special food plan before surgery, ask your healthcare provider if you should follow it while you get better. These tips may help:    Don't push yourself to eat. Your body will tell you when to eat and how much.  Start off with clear liquids and soup. They are easier to digest.  Next try semi-solid foods, such as mashed potatoes, applesauce, and gelatin, as you feel ready.  Slowly move to solid foods. Don t eat fatty, rich, or spicy foods at first.  Don't force yourself to have 3 large meals a day. Instead eat smaller amounts more often.  Take pain medicines with a small amount of solid food, such as crackers or toast, to prevent nausea.    If you have obstructive sleep apnea    You were given anesthesia medicine during surgery to keep you comfortable and free of pain. After surgery, you may have more apnea spells because of this medicine and other medicines you were given. The spells may last longer than usual.   At home:    Keep using the continuous positive airway pressure (CPAP) device when you sleep. Unless your healthcare provider tells you not to, use it when you sleep, day or night. CPAP is a common device used to treat obstructive sleep  apnea.  Talk with your provider before taking any pain medicine, muscle relaxants, or sedatives. Your provider will tell you about the possible dangers of taking these medicines.

## 2022-09-14 NOTE — ANESTHESIA POSTPROCEDURE EVALUATION
Patient: Miky Mccann    Procedure: Procedure(s):  COLONOSCOPY AND POLYPECTOMY       Anesthesia Type:  MAC    Note:  Disposition: Outpatient   Postop Pain Control: Uneventful            Sign Out: Well controlled pain   PONV: No   Neuro/Psych: Uneventful            Sign Out: Acceptable/Baseline neuro status   Airway/Respiratory: Uneventful            Sign Out: Acceptable/Baseline resp. status   CV/Hemodynamics: Uneventful            Sign Out: Acceptable CV status; No obvious hypovolemia; No obvious fluid overload   Other NRE: NONE   DID A NON-ROUTINE EVENT OCCUR? No           Last vitals:  Vitals Value Taken Time   /67 09/14/22 1248   Temp 96.8  F (36  C) 09/14/22 1235   Pulse 61 09/14/22 1248   Resp 18 09/14/22 1248   SpO2 96 % 09/14/22 1248       Electronically Signed By: Jaime Nunez MD  September 14, 2022  3:15 PM

## 2022-09-14 NOTE — H&P
"PRE PROCEDURE NOTE - H & P      Chief Complaint/Reason for Procedure:              Screening colonoscopy      History and Physical Reviewed:   Reviewed no changes               Pre-sedation assessment:            /85   Pulse 69   Temp 98.2  F (36.8  C) (Temporal)   Resp 18   Ht 1.935 m (6' 4.18\")   Wt (!) 168.9 kg (372 lb 5.7 oz)   SpO2 94%   BMI 45.11 kg/m    General appearance: alert, appears stated age and cooperative  Lungs: clear to auscultation bilaterally  Heart: regular rate and rhythm      Previous reaction to anesthesia/sedation:        Sedation Plan based on assessment: Moderate      Comments:       ASA Classification: 3      Impression:  Patient deemed adequate candidate for conscious sedation         Plan:   colonoscopy      Ramon Henry MD  9/14/2022 11:14 AM  [facility]  [phone]                                "

## 2022-09-14 NOTE — PROGRESS NOTES
Reviewed Dr. Henry's post-op with patient and his wife.  Patient and wife did not want to wait to talk with Dr. Henry.  Discharge instructions were reviewed and patient discharged to home with his wife.  Norma Coronel RN on 9/14/2022 at 1:26 PM

## 2022-10-01 ENCOUNTER — HEALTH MAINTENANCE LETTER (OUTPATIENT)
Age: 57
End: 2022-10-01

## 2022-10-25 ENCOUNTER — TRANSFERRED RECORDS (OUTPATIENT)
Dept: HEALTH INFORMATION MANAGEMENT | Facility: CLINIC | Age: 57
End: 2022-10-25

## 2022-11-01 ENCOUNTER — TRANSFERRED RECORDS (OUTPATIENT)
Dept: HEALTH INFORMATION MANAGEMENT | Facility: CLINIC | Age: 57
End: 2022-11-01

## 2022-11-07 ENCOUNTER — TRANSFERRED RECORDS (OUTPATIENT)
Dept: HEALTH INFORMATION MANAGEMENT | Facility: CLINIC | Age: 57
End: 2022-11-07

## 2022-11-23 ENCOUNTER — TRANSFERRED RECORDS (OUTPATIENT)
Dept: HEALTH INFORMATION MANAGEMENT | Facility: CLINIC | Age: 57
End: 2022-11-23

## 2022-12-29 ENCOUNTER — TRANSFERRED RECORDS (OUTPATIENT)
Dept: HEALTH INFORMATION MANAGEMENT | Facility: CLINIC | Age: 57
End: 2022-12-29

## 2023-01-05 ENCOUNTER — TRANSFERRED RECORDS (OUTPATIENT)
Dept: HEALTH INFORMATION MANAGEMENT | Facility: CLINIC | Age: 58
End: 2023-01-05

## 2023-08-06 ENCOUNTER — HEALTH MAINTENANCE LETTER (OUTPATIENT)
Age: 58
End: 2023-08-06

## 2023-10-10 ENCOUNTER — TRANSFERRED RECORDS (OUTPATIENT)
Dept: HEALTH INFORMATION MANAGEMENT | Facility: CLINIC | Age: 58
End: 2023-10-10
Payer: COMMERCIAL

## 2024-04-19 ENCOUNTER — DOCUMENTATION ONLY (OUTPATIENT)
Dept: FAMILY MEDICINE | Facility: CLINIC | Age: 59
End: 2024-04-19
Payer: COMMERCIAL

## 2024-04-19 DIAGNOSIS — Z12.5 SCREENING FOR PROSTATE CANCER: Primary | ICD-10-CM

## 2024-04-19 DIAGNOSIS — Z00.00 HEALTH CARE MAINTENANCE: ICD-10-CM

## 2024-04-19 NOTE — PROGRESS NOTES
Miky Mccann has an upcoming lab appointment:    Future Appointments   Date Time Provider Department Center   4/29/2024  7:00 AM [facility] LAB [facility]   5/6/2024  3:10 PM Alexandro Tan MD [facility]     Patient is scheduled for the following lab(s): Annual Fasting Labs    There is no order available. Please review and place either future orders or HMPO (Review of Health Maintenance Protocol Orders), as appropriate.    Health Maintenance Due   Topic    HIV SCREENING     ANNUAL REVIEW OF HM ORDERS      Carlotta Kirby

## 2024-04-29 ENCOUNTER — LAB (OUTPATIENT)
Dept: LAB | Facility: CLINIC | Age: 59
End: 2024-04-29
Payer: COMMERCIAL

## 2024-04-29 DIAGNOSIS — Z12.5 SCREENING FOR PROSTATE CANCER: ICD-10-CM

## 2024-04-29 DIAGNOSIS — Z00.00 HEALTH CARE MAINTENANCE: ICD-10-CM

## 2024-04-29 DIAGNOSIS — R73.9 HYPERGLYCEMIA: ICD-10-CM

## 2024-04-29 LAB
ALBUMIN SERPL BCG-MCNC: 4.2 G/DL (ref 3.5–5.2)
ALP SERPL-CCNC: 56 U/L (ref 40–150)
ALT SERPL W P-5'-P-CCNC: 32 U/L (ref 0–70)
ANION GAP SERPL CALCULATED.3IONS-SCNC: 11 MMOL/L (ref 7–15)
AST SERPL W P-5'-P-CCNC: 20 U/L (ref 0–45)
BILIRUB SERPL-MCNC: 0.4 MG/DL
BUN SERPL-MCNC: 15.7 MG/DL (ref 8–23)
CALCIUM SERPL-MCNC: 8.9 MG/DL (ref 8.6–10)
CHLORIDE SERPL-SCNC: 102 MMOL/L (ref 98–107)
CHOLEST SERPL-MCNC: 180 MG/DL
CREAT SERPL-MCNC: 0.98 MG/DL (ref 0.67–1.17)
DEPRECATED HCO3 PLAS-SCNC: 26 MMOL/L (ref 22–29)
EGFRCR SERPLBLD CKD-EPI 2021: 89 ML/MIN/1.73M2
ERYTHROCYTE [DISTWIDTH] IN BLOOD BY AUTOMATED COUNT: 12.9 % (ref 10–15)
FASTING STATUS PATIENT QL REPORTED: YES
GLUCOSE SERPL-MCNC: 124 MG/DL (ref 70–99)
HCT VFR BLD AUTO: 43.4 % (ref 40–53)
HDLC SERPL-MCNC: 43 MG/DL
HGB BLD-MCNC: 14.2 G/DL (ref 13.3–17.7)
LDLC SERPL CALC-MCNC: 105 MG/DL
MCH RBC QN AUTO: 29.1 PG (ref 26.5–33)
MCHC RBC AUTO-ENTMCNC: 32.7 G/DL (ref 31.5–36.5)
MCV RBC AUTO: 89 FL (ref 78–100)
NONHDLC SERPL-MCNC: 137 MG/DL
PLATELET # BLD AUTO: 160 10E3/UL (ref 150–450)
POTASSIUM SERPL-SCNC: 5 MMOL/L (ref 3.4–5.3)
PROT SERPL-MCNC: 6.7 G/DL (ref 6.4–8.3)
PSA SERPL DL<=0.01 NG/ML-MCNC: 0.61 NG/ML (ref 0–3.5)
RBC # BLD AUTO: 4.88 10E6/UL (ref 4.4–5.9)
SODIUM SERPL-SCNC: 139 MMOL/L (ref 135–145)
TRIGL SERPL-MCNC: 160 MG/DL
WBC # BLD AUTO: 5 10E3/UL (ref 4–11)

## 2024-04-29 PROCEDURE — 85027 COMPLETE CBC AUTOMATED: CPT

## 2024-04-29 PROCEDURE — 80061 LIPID PANEL: CPT

## 2024-04-29 PROCEDURE — 36415 COLL VENOUS BLD VENIPUNCTURE: CPT

## 2024-04-29 PROCEDURE — 83036 HEMOGLOBIN GLYCOSYLATED A1C: CPT

## 2024-04-29 PROCEDURE — 80053 COMPREHEN METABOLIC PANEL: CPT

## 2024-04-29 PROCEDURE — G0103 PSA SCREENING: HCPCS

## 2024-04-30 DIAGNOSIS — R73.9 HYPERGLYCEMIA: Primary | ICD-10-CM

## 2024-04-30 LAB — HBA1C MFR BLD: 5.8 % (ref 0–5.6)

## 2024-05-06 ENCOUNTER — OFFICE VISIT (OUTPATIENT)
Dept: FAMILY MEDICINE | Facility: CLINIC | Age: 59
End: 2024-05-06
Payer: COMMERCIAL

## 2024-05-06 VITALS
HEIGHT: 76 IN | OXYGEN SATURATION: 96 % | WEIGHT: 315 LBS | TEMPERATURE: 98 F | HEART RATE: 66 BPM | RESPIRATION RATE: 20 BRPM | DIASTOLIC BLOOD PRESSURE: 90 MMHG | BODY MASS INDEX: 38.36 KG/M2 | SYSTOLIC BLOOD PRESSURE: 149 MMHG

## 2024-05-06 DIAGNOSIS — Z00.00 HEALTH CARE MAINTENANCE: ICD-10-CM

## 2024-05-06 DIAGNOSIS — E66.01 MORBID OBESITY (H): Primary | ICD-10-CM

## 2024-05-06 DIAGNOSIS — R03.0 ELEVATED BLOOD PRESSURE READING WITHOUT DIAGNOSIS OF HYPERTENSION: ICD-10-CM

## 2024-05-06 PROCEDURE — 99396 PREV VISIT EST AGE 40-64: CPT | Performed by: FAMILY MEDICINE

## 2024-05-06 SDOH — HEALTH STABILITY: PHYSICAL HEALTH: ON AVERAGE, HOW MANY DAYS PER WEEK DO YOU ENGAGE IN MODERATE TO STRENUOUS EXERCISE (LIKE A BRISK WALK)?: 3 DAYS

## 2024-05-06 ASSESSMENT — SOCIAL DETERMINANTS OF HEALTH (SDOH): HOW OFTEN DO YOU GET TOGETHER WITH FRIENDS OR RELATIVES?: PATIENT DECLINED

## 2024-05-06 NOTE — PROGRESS NOTES
"Preventive Care Visit  [facility]  Alexandro Tan MD, Family Medicine  May 6, 2024      Assessment & Plan     Health care maintenance  Patient here for annual exam  Labs already obtained reviewed results with him today  Up-to-date on immunizations and colon cancer screening    Morbid obesity (H)  Patient is aware of his weight  We discussed diet and exercise  Increased weight contributing to worsening osteoarthritis as well as elevated blood pressure    Elevated blood pressure reading without diagnosis of hypertension  Patient's blood pressure elevated today  Previously had been normal patient is up another 25 pounds  Discussed diet and exercise and the need for weight loss  Discussed returning for nurse blood pressure check in a couple weeks if blood pressure remains elevated consider starting lisinopril      Patient has been advised of split billing requirements and indicates understanding: Yes          BMI  Estimated body mass index is 48.69 kg/m  as calculated from the following:    Height as of this encounter: 1.93 m (6' 4\").    Weight as of this encounter: 181.4 kg (400 lb).       Counseling  Appropriate preventive services were discussed with this patient, including applicable screening as appropriate for fall prevention, nutrition, physical activity, Tobacco-use cessation, weight loss and cognition.  Checklist reviewing preventive services available has been given to the patient.  Reviewed patient's diet, addressing concerns and/or questions.   He is at risk for lack of exercise and has been provided with information to increase physical activity for the benefit of his well-being.   The patient was instructed to see the dentist every 6 months.           Dalton Bolaños is a 58 year old, presenting for the following:  Physical (Not fasting )        5/6/2024     3:11 PM   Additional Questions   Roomed by Jessica COPELAND CMA        Health Care Directive  Patient does not have a " Health Care Directive or Living Will: Discussed advance care planning with patient; information given to patient to review.    HPI  Patient here for annual exam.  Patient is aware of his weight and we discussed diet and exercise.  He has not been as active as he has been in the past and we discussed his food quantity intake.  Discussed his elevated glucose levels but normal A1c.  We discussed lowering carbohydrates in the diet we discussed diet and exercise.  Blood pressure is elevated elevated weight is likely contributing to his blood pressure but also decrease in physical activity.  We discussed returning for nurse blood pressure check and possibly starting lisinopril.  Up-to-date on other healthcare maintenance.            5/6/2024   General Health   How would you rate your overall physical health? (!) FAIR   Feel stress (tense, anxious, or unable to sleep) To some extent   (!) STRESS CONCERN      5/6/2024   Nutrition   Three or more servings of calcium each day? Yes   Diet: Regular (no restrictions)   How many servings of fruit and vegetables per day? (!) 2-3   How many sweetened beverages each day? 0-1         5/6/2024   Exercise   Days per week of moderate/strenous exercise 3 days         5/6/2024   Social Factors   Frequency of gathering with friends or relatives Patient declined   Worry food won't last until get money to buy more No   Food not last or not have enough money for food? No   Do you have housing?  Yes   Are you worried about losing your housing? No   Lack of transportation? No   Unable to get utilities (heat,electricity)? No         5/6/2024   Fall Risk   Fallen 2 or more times in the past year? No   Trouble with walking or balance? No          5/6/2024   Dental   Dentist two times every year? (!) NO         5/6/2024   TB Screening   Were you born outside of the [place]? No         Today's PHQ-2 Score:       5/6/2024     3:00 PM   PHQ-2 ( 1999 [employer])   Q1: Little interest or pleasure in doing things  "0   Q2: Feeling down, depressed or hopeless 0   PHQ-2 Score 0   Q1: Little interest or pleasure in doing things Not at all   Q2: Feeling down, depressed or hopeless Not at all   PHQ-2 Score 0           2024   Substance Use   Alcohol more than 3/day or more than 7/wk Not Applicable   Do you use any other substances recreationally? (!) ALCOHOL     Social History     Tobacco Use    Smoking status: Former     Current packs/day: 0.00     Average packs/day: 1 pack/day for 17.0 years (17.0 ttl pk-yrs)     Types: Cigarettes     Start date:      Quit date:      Years since quittin.3    Smokeless tobacco: Former     Types: Chew     Quit date: 2019   Substance Use Topics    Alcohol use: Yes     Comment: occassional    Drug use: No             2024   One time HIV Screening   Previous HIV test? No         2024   STI Screening   New sexual partner(s) since last STI/HIV test? (!) DECLINE   Last PSA:   Prostate Specific Antigen Screen   Date Value Ref Range Status   2024 0.61 0.00 - 3.50 ng/mL Final   2022 0.65 0.00 - 3.50 ug/L Final     ASCVD Risk   The 10-year ASCVD risk score (Antonino BRANTLEY, et al., 2019) is: 10.5%    Values used to calculate the score:      Age: 58 years      Sex: Male      Is Non- : No      Diabetic: No      Tobacco smoker: No      Systolic Blood Pressure: 150 mmHg      Is BP treated: No      HDL Cholesterol: 43 mg/dL      Total Cholesterol: 180 mg/dL           Reviewed and updated as needed this visit by Provider                             Objective    Exam  BP (!) 150/93 (BP Location: Left arm, Patient Position: Sitting, Cuff Size: Adult Large)   Pulse 75   Temp 98  F (36.7  C) (Oral)   Resp 20   Ht 1.93 m (6' 4\")   Wt (!) 181.4 kg (400 lb)   SpO2 96%   BMI 48.69 kg/m     Estimated body mass index is 48.69 kg/m  as calculated from the following:    Height as of this encounter: 1.93 m (6' 4\").    Weight as of this encounter: 181.4 " kg (400 lb).    Physical Exam  GENERAL: alert and no distress  EYES: Eyes grossly normal to inspection, PERRL and conjunctivae and sclerae normal  HENT: ear canals and TM's normal, nose and mouth without ulcers or lesions  RESP: lungs clear to auscultation - no rales, rhonchi or wheezes  CV: regular rate and rhythm, normal S1 S2, no S3 or S4, no murmur, click or rub, no peripheral edema  ABDOMEN: bowel sounds normal  MS: no gross musculoskeletal defects noted, no edema  NEURO: Normal strength and tone, mentation intact and speech normal  PSYCH: mentation appears normal, affect normal/bright        Signed Electronically by: Alexandro Tan MD

## 2024-05-13 ENCOUNTER — TRANSFERRED RECORDS (OUTPATIENT)
Dept: HEALTH INFORMATION MANAGEMENT | Facility: CLINIC | Age: 59
End: 2024-05-13
Payer: COMMERCIAL

## 2024-05-20 ENCOUNTER — ALLIED HEALTH/NURSE VISIT (OUTPATIENT)
Dept: FAMILY MEDICINE | Facility: CLINIC | Age: 59
End: 2024-05-20
Payer: COMMERCIAL

## 2024-05-20 VITALS — SYSTOLIC BLOOD PRESSURE: 146 MMHG | DIASTOLIC BLOOD PRESSURE: 81 MMHG | HEART RATE: 62 BPM

## 2024-05-20 DIAGNOSIS — Z01.30 BP CHECK: Primary | ICD-10-CM

## 2024-05-20 PROCEDURE — 99207 PR NO CHARGE NURSE ONLY: CPT

## 2024-05-20 NOTE — PROGRESS NOTES
I met with Miky Mccann at the request of Dr. Tan to recheck his blood pressure.  Blood pressure medications on the med list were reviewed with patient.    Patient has taken all medications as per usual regimen: Yes  Patient reports tolerating them without any issues or concerns: Yes    Vitals:    05/20/24 1603 05/20/24 1619   BP: (!) 149/86 (!) 146/81   Pulse: 70 62       After 5 minutes, the patient's blood pressure remained greater than or equal to 140/90.    Is the patient currently having any chest pain? No  Does the patient currently have a headache? No  Does the patient currently have any vision changes? No  Does the patient currently have any nausea? No  Does the patient currently have any abdominal pain? No    The previous encounter was reviewed.  The patient was discharged and the note will be sent to the provider for final review.    I put some home readings in your box for review.  We checked his machine against ours - the last reading on home machine was  - 122/87 pulse 70.

## 2024-05-21 ENCOUNTER — TELEPHONE (OUTPATIENT)
Dept: FAMILY MEDICINE | Facility: CLINIC | Age: 59
End: 2024-05-21
Payer: COMMERCIAL

## 2024-05-21 NOTE — TELEPHONE ENCOUNTER
Patient states that he would like to come in one more time before starting the medication. Patient is scheduled on 06/03/24 to recheck bp and if it is still elevated he will start lisinopril.       Alexandro Tan MD at 5/20/2024  4:00 PM    Status: Signed   Blood pressure remains elevated- would recommend starting lisinopril - if patient is in agreement I will send to the pharmacy.

## 2024-05-21 NOTE — PROGRESS NOTES
Blood pressure remains elevated- would recommend starting lisinopril - if patient is in agreement I will send to the pharmacy.

## 2024-06-03 ENCOUNTER — TELEPHONE (OUTPATIENT)
Dept: FAMILY MEDICINE | Facility: CLINIC | Age: 59
End: 2024-06-03

## 2024-06-03 ENCOUNTER — ALLIED HEALTH/NURSE VISIT (OUTPATIENT)
Dept: FAMILY MEDICINE | Facility: CLINIC | Age: 59
End: 2024-06-03
Payer: COMMERCIAL

## 2024-06-03 VITALS — HEART RATE: 63 BPM | DIASTOLIC BLOOD PRESSURE: 87 MMHG | SYSTOLIC BLOOD PRESSURE: 129 MMHG

## 2024-06-03 DIAGNOSIS — Z01.30 BP CHECK: Primary | ICD-10-CM

## 2024-06-03 DIAGNOSIS — I10 ESSENTIAL HYPERTENSION: Primary | ICD-10-CM

## 2024-06-03 PROCEDURE — 99207 PR NO CHARGE NURSE ONLY: CPT

## 2024-06-03 RX ORDER — LISINOPRIL 10 MG/1
10 TABLET ORAL DAILY
Qty: 90 TABLET | Refills: 3 | Status: SHIPPED | OUTPATIENT
Start: 2024-06-03 | End: 2024-07-23

## 2024-06-03 NOTE — TELEPHONE ENCOUNTER
Called and spoke with patient, relayed PCP recommendations below:        Patient is in agreement with starting lisinopril, pharmacy pended. Scheduled for nurse only BP recheck on 6/19, please advise if you would like to check labs at this time as well.    Peggy Fuentes RN

## 2024-06-03 NOTE — TELEPHONE ENCOUNTER
Would like patient to check labs the same day of blood pressure recheck- I sent medication to the pharmacy and order for labs.

## 2024-06-03 NOTE — PROGRESS NOTES
Follow Up Blood Pressure Check    Miky Mccann is a 58 year old male recommended to follow up for blood pressure check by Alexandro Tanihypertensive medications and adherence were verified:  not on any meds at this time .     Reason for visit:     Medication change at last visit: none, see note    Today's Vitals:   Vitals:    06/03/24 0734 06/03/24 0741   BP: (!) 147/89 129/87   Pulse: 63 63       Home blood pressure readings brought in today:       Lowest blood pressure today is less than 140/90 and they deny signs or symptoms of new onset: severe headache, fatigue, confusion, vision changes, chest pain, pounding in the chest, neck, ears, irregular heartbeat, difficulty breathing, and blood in the urine.  Please inform patient of his/her blood pressure today.  If they are asymptomatic, the patient is to continue current medications.  This message will be routed to their provider, and they will be notified if a change in medication is recommended.    ( may be deleted if not applicable) If lowest blood pressure is greater than 200/110, regardless if symptoms are present, patient needs to be evaluated by a provider today.    Johanna Griggs, DUNG    No current outpatient medications on file.

## 2024-06-03 NOTE — PROGRESS NOTES
Blood pressure remains above goal of 130/80  I would recommend starting lisinopril- if he is in agreement I will send to the pharmacy.

## 2024-06-19 ENCOUNTER — LAB (OUTPATIENT)
Dept: LAB | Facility: CLINIC | Age: 59
End: 2024-06-19
Payer: COMMERCIAL

## 2024-06-19 ENCOUNTER — ALLIED HEALTH/NURSE VISIT (OUTPATIENT)
Dept: FAMILY MEDICINE | Facility: CLINIC | Age: 59
End: 2024-06-19
Payer: COMMERCIAL

## 2024-06-19 VITALS — HEART RATE: 63 BPM | DIASTOLIC BLOOD PRESSURE: 82 MMHG | SYSTOLIC BLOOD PRESSURE: 137 MMHG

## 2024-06-19 DIAGNOSIS — I10 ESSENTIAL HYPERTENSION: Primary | ICD-10-CM

## 2024-06-19 DIAGNOSIS — I10 ESSENTIAL HYPERTENSION: ICD-10-CM

## 2024-06-19 LAB
ANION GAP SERPL CALCULATED.3IONS-SCNC: 9 MMOL/L (ref 7–15)
BUN SERPL-MCNC: 12.2 MG/DL (ref 8–23)
CALCIUM SERPL-MCNC: 9.1 MG/DL (ref 8.6–10)
CHLORIDE SERPL-SCNC: 102 MMOL/L (ref 98–107)
CREAT SERPL-MCNC: 0.98 MG/DL (ref 0.67–1.17)
DEPRECATED HCO3 PLAS-SCNC: 27 MMOL/L (ref 22–29)
EGFRCR SERPLBLD CKD-EPI 2021: 89 ML/MIN/1.73M2
GLUCOSE SERPL-MCNC: 116 MG/DL (ref 70–99)
POTASSIUM SERPL-SCNC: 4.7 MMOL/L (ref 3.4–5.3)
SODIUM SERPL-SCNC: 138 MMOL/L (ref 135–145)

## 2024-06-19 PROCEDURE — 99207 PR NO CHARGE NURSE ONLY: CPT

## 2024-06-19 PROCEDURE — 80048 BASIC METABOLIC PNL TOTAL CA: CPT

## 2024-06-19 PROCEDURE — 36415 COLL VENOUS BLD VENIPUNCTURE: CPT

## 2024-06-19 NOTE — PROGRESS NOTES
Follow Up Blood Pressure Check    Miky Mccann is a 58 year old male recommended to follow up for blood pressure check by Alexandro Tanihypertensive medications and adherence were verified: Yes.     Reason for visit:     Medication change at last visit:     Today's Vitals:   Vitals:    06/19/24 0727 06/19/24 0740   BP: (!) 141/89 137/82   BP Location: Left arm Left arm   Patient Position: Sitting Sitting   Cuff Size: Adult Large Adult Large   Pulse: 63 63           Lowest blood pressure today is less than 140/90 and they deny signs or symptoms of new onset: severe headache, fatigue, confusion, vision changes, chest pain, pounding in the chest, neck, ears, irregular heartbeat, difficulty breathing, and blood in the urine.  Please inform patient of his/her blood pressure today.  If they are asymptomatic, the patient is to continue current medications.  This message will be routed to their provider, and they will be notified if a change in medication is recommended.      Shira Mathis MA    Current Outpatient Medications   Medication Sig Dispense Refill    lisinopril (ZESTRIL) 10 MG tablet Take 1 tablet (10 mg) by mouth daily 90 tablet 3

## 2024-06-19 NOTE — PROGRESS NOTES
Blood pressure near goal range- would suggest increase to 20 mg- recheck in 10 days- take two of the 10 mg tablets at the same time to use up what you have.

## 2024-07-23 ENCOUNTER — MYC MEDICAL ADVICE (OUTPATIENT)
Dept: FAMILY MEDICINE | Facility: CLINIC | Age: 59
End: 2024-07-23
Payer: COMMERCIAL

## 2024-07-23 DIAGNOSIS — I10 ESSENTIAL HYPERTENSION: ICD-10-CM

## 2024-07-23 NOTE — TELEPHONE ENCOUNTER
Pended 20mg lisinopril tablet here for review.    Miguel Goldstein RN     [facility]

## 2024-07-24 RX ORDER — LISINOPRIL 20 MG/1
20 TABLET ORAL DAILY
Qty: 90 TABLET | Refills: 3 | Status: SHIPPED | OUTPATIENT
Start: 2024-07-24

## 2024-10-09 ENCOUNTER — TRANSFERRED RECORDS (OUTPATIENT)
Dept: HEALTH INFORMATION MANAGEMENT | Facility: CLINIC | Age: 59
End: 2024-10-09
Payer: COMMERCIAL

## 2025-04-08 ENCOUNTER — TRANSFERRED RECORDS (OUTPATIENT)
Dept: HEALTH INFORMATION MANAGEMENT | Facility: CLINIC | Age: 60
End: 2025-04-08
Payer: COMMERCIAL

## 2025-06-01 ENCOUNTER — MYC MEDICAL ADVICE (OUTPATIENT)
Dept: FAMILY MEDICINE | Facility: CLINIC | Age: 60
End: 2025-06-01
Payer: COMMERCIAL

## 2025-06-01 DIAGNOSIS — K21.9 GASTROESOPHAGEAL REFLUX DISEASE: ICD-10-CM

## 2025-06-01 DIAGNOSIS — K21.9 GASTROESOPHAGEAL REFLUX DISEASE WITHOUT ESOPHAGITIS: Primary | ICD-10-CM

## 2025-06-02 DIAGNOSIS — K21.9 GASTROESOPHAGEAL REFLUX DISEASE WITHOUT ESOPHAGITIS: ICD-10-CM

## 2025-06-02 RX ORDER — FAMOTIDINE 20 MG/1
20 TABLET, FILM COATED ORAL 2 TIMES DAILY PRN
Qty: 180 TABLET | Refills: 0 | Status: SHIPPED | OUTPATIENT
Start: 2025-06-02 | End: 2025-06-04

## 2025-06-08 ENCOUNTER — HEALTH MAINTENANCE LETTER (OUTPATIENT)
Age: 60
End: 2025-06-08

## 2025-07-17 DIAGNOSIS — I10 ESSENTIAL HYPERTENSION: ICD-10-CM

## 2025-07-17 RX ORDER — LISINOPRIL 20 MG/1
20 TABLET ORAL DAILY
Qty: 90 TABLET | Refills: 3 | Status: SHIPPED | OUTPATIENT
Start: 2025-07-17